# Patient Record
Sex: MALE | Race: WHITE | Employment: OTHER | URBAN - NONMETROPOLITAN AREA
[De-identification: names, ages, dates, MRNs, and addresses within clinical notes are randomized per-mention and may not be internally consistent; named-entity substitution may affect disease eponyms.]

---

## 2017-01-17 ENCOUNTER — OFFICE VISIT (OUTPATIENT)
Dept: CARDIOLOGY CLINIC | Age: 62
End: 2017-01-17

## 2017-01-17 VITALS
SYSTOLIC BLOOD PRESSURE: 130 MMHG | BODY MASS INDEX: 35.46 KG/M2 | WEIGHT: 234 LBS | RESPIRATION RATE: 16 BRPM | DIASTOLIC BLOOD PRESSURE: 90 MMHG | HEIGHT: 68 IN | HEART RATE: 84 BPM

## 2017-01-17 DIAGNOSIS — Z99.89 OSA ON CPAP: ICD-10-CM

## 2017-01-17 DIAGNOSIS — Z95.1 S/P CABG X 2: Primary | ICD-10-CM

## 2017-01-17 DIAGNOSIS — E78.2 MIXED HYPERLIPIDEMIA: ICD-10-CM

## 2017-01-17 DIAGNOSIS — G47.33 OSA ON CPAP: ICD-10-CM

## 2017-01-17 PROCEDURE — 99214 OFFICE O/P EST MOD 30 MIN: CPT | Performed by: INTERNAL MEDICINE

## 2017-05-01 ENCOUNTER — HOSPITAL ENCOUNTER (OUTPATIENT)
Dept: GENERAL RADIOLOGY | Age: 62
Discharge: HOME | End: 2017-05-01

## 2017-05-01 DIAGNOSIS — M25.561 ACUTE PAIN OF RIGHT KNEE: ICD-10-CM

## 2017-05-03 ENCOUNTER — OFFICE VISIT (OUTPATIENT)
Dept: ORTHOPEDIC SURGERY | Age: 62
End: 2017-05-03

## 2017-05-03 VITALS — HEIGHT: 68 IN | WEIGHT: 225 LBS | RESPIRATION RATE: 16 BRPM | BODY MASS INDEX: 34.1 KG/M2

## 2017-05-03 DIAGNOSIS — M25.561 RIGHT KNEE PAIN, UNSPECIFIED CHRONICITY: ICD-10-CM

## 2017-05-03 DIAGNOSIS — S83.281A TEAR OF LATERAL MENISCUS OF RIGHT KNEE, CURRENT, UNSPECIFIED TEAR TYPE, INITIAL ENCOUNTER: Primary | ICD-10-CM

## 2017-05-03 PROCEDURE — 99244 OFF/OP CNSLTJ NEW/EST MOD 40: CPT | Performed by: PHYSICIAN ASSISTANT

## 2017-05-03 PROCEDURE — 73560 X-RAY EXAM OF KNEE 1 OR 2: CPT | Performed by: PHYSICIAN ASSISTANT

## 2017-05-03 RX ORDER — GABAPENTIN 300 MG/1
300 CAPSULE ORAL 2 TIMES DAILY
COMMUNITY
Start: 2017-03-28 | End: 2018-08-27

## 2017-05-03 ASSESSMENT — ENCOUNTER SYMPTOMS
EYES NEGATIVE: 1
GASTROINTESTINAL NEGATIVE: 1
RESPIRATORY NEGATIVE: 1

## 2017-05-10 ENCOUNTER — HOSPITAL ENCOUNTER (OUTPATIENT)
Dept: MRI IMAGING | Age: 62
Discharge: HOME | End: 2017-05-10

## 2017-05-10 DIAGNOSIS — M25.561 RIGHT KNEE PAIN, UNSPECIFIED CHRONICITY: ICD-10-CM

## 2017-05-10 DIAGNOSIS — S83.281A TEAR OF LATERAL MENISCUS OF RIGHT KNEE, CURRENT, UNSPECIFIED TEAR TYPE, INITIAL ENCOUNTER: ICD-10-CM

## 2017-05-10 DIAGNOSIS — S83.281A OTHER TEAR OF LATERAL MENISCUS, CURRENT INJURY, RIGHT KNEE, INITIAL ENCOUNTER: ICD-10-CM

## 2017-05-15 ENCOUNTER — TELEPHONE (OUTPATIENT)
Dept: ORTHOPEDIC SURGERY | Age: 62
End: 2017-05-15

## 2017-05-18 ENCOUNTER — OFFICE VISIT (OUTPATIENT)
Dept: ORTHOPEDIC SURGERY | Age: 62
End: 2017-05-18

## 2017-05-18 VITALS — RESPIRATION RATE: 16 BRPM | BODY MASS INDEX: 34.1 KG/M2 | WEIGHT: 225 LBS | HEIGHT: 68 IN

## 2017-05-18 DIAGNOSIS — M17.11 PRIMARY OSTEOARTHRITIS OF RIGHT KNEE: Primary | ICD-10-CM

## 2017-05-18 DIAGNOSIS — M23.203 OLD TEAR OF MEDIAL MENISCUS OF RIGHT KNEE, UNSPECIFIED TEAR TYPE: ICD-10-CM

## 2017-05-18 DIAGNOSIS — M54.16 LUMBAR RADICULOPATHY: ICD-10-CM

## 2017-05-18 PROCEDURE — 99213 OFFICE O/P EST LOW 20 MIN: CPT | Performed by: ORTHOPAEDIC SURGERY

## 2017-05-18 PROCEDURE — 20610 DRAIN/INJ JOINT/BURSA W/O US: CPT | Performed by: ORTHOPAEDIC SURGERY

## 2017-06-26 LAB
ALBUMIN SERPL-MCNC: 4.4 G/DL
ALP BLD-CCNC: 81 U/L
ALT SERPL-CCNC: 121 U/L
ANION GAP SERPL CALCULATED.3IONS-SCNC: NORMAL MMOL/L
AST SERPL-CCNC: 58 U/L
BILIRUB SERPL-MCNC: 0.6 MG/DL (ref 0.1–1.4)
BUN BLDV-MCNC: 14 MG/DL
CALCIUM SERPL-MCNC: NORMAL MG/DL
CHLORIDE BLD-SCNC: 104 MMOL/L
CHOLESTEROL, TOTAL: 268 MG/DL
CHOLESTEROL/HDL RATIO: ABNORMAL
CO2: NORMAL MMOL/L
CREAT SERPL-MCNC: 0.9 MG/DL
GFR CALCULATED: NORMAL
GLUCOSE BLD-MCNC: 80 MG/DL
HDLC SERPL-MCNC: 38 MG/DL (ref 35–70)
LDL CHOLESTEROL CALCULATED: 197 MG/DL (ref 0–160)
POTASSIUM SERPL-SCNC: 4.4 MMOL/L
SODIUM BLD-SCNC: 141 MMOL/L
TOTAL PROTEIN: NORMAL
TRIGL SERPL-MCNC: 167 MG/DL
TSH SERPL DL<=0.05 MIU/L-ACNC: 1.28 UIU/ML
VLDLC SERPL CALC-MCNC: ABNORMAL MG/DL

## 2017-07-14 LAB
BASOPHILS ABSOLUTE: NORMAL /ΜL
BASOPHILS RELATIVE PERCENT: NORMAL %
EOSINOPHILS ABSOLUTE: NORMAL /ΜL
EOSINOPHILS RELATIVE PERCENT: NORMAL %
HCT VFR BLD CALC: 41.1 % (ref 41–53)
HEMOGLOBIN: 14.2 G/DL (ref 13.5–17.5)
LYMPHOCYTES ABSOLUTE: NORMAL /ΜL
LYMPHOCYTES RELATIVE PERCENT: NORMAL %
MCH RBC QN AUTO: NORMAL PG
MCHC RBC AUTO-ENTMCNC: NORMAL G/DL
MCV RBC AUTO: NORMAL FL
MONOCYTES ABSOLUTE: NORMAL /ΜL
MONOCYTES RELATIVE PERCENT: NORMAL %
NEUTROPHILS ABSOLUTE: NORMAL /ΜL
NEUTROPHILS RELATIVE PERCENT: NORMAL %
PLATELET # BLD: 214 K/ΜL
PMV BLD AUTO: NORMAL FL
RBC # BLD: 4.5 10^6/ΜL
WBC # BLD: 8.69 10^3/ML

## 2017-08-21 ENCOUNTER — OFFICE VISIT (OUTPATIENT)
Dept: CARDIOLOGY CLINIC | Age: 62
End: 2017-08-21

## 2017-08-21 VITALS
HEIGHT: 68 IN | BODY MASS INDEX: 33.8 KG/M2 | HEART RATE: 100 BPM | RESPIRATION RATE: 14 BRPM | SYSTOLIC BLOOD PRESSURE: 128 MMHG | WEIGHT: 223 LBS | DIASTOLIC BLOOD PRESSURE: 84 MMHG

## 2017-08-21 DIAGNOSIS — Z99.89 OSA ON CPAP: ICD-10-CM

## 2017-08-21 DIAGNOSIS — Z95.1 S/P CABG X 2: Primary | ICD-10-CM

## 2017-08-21 DIAGNOSIS — G47.33 OSA ON CPAP: ICD-10-CM

## 2017-08-21 DIAGNOSIS — E78.2 MIXED HYPERLIPIDEMIA: ICD-10-CM

## 2017-08-21 DIAGNOSIS — I25.810 CORONARY ARTERY DISEASE INVOLVING CORONARY BYPASS GRAFT OF NATIVE HEART WITHOUT ANGINA PECTORIS: ICD-10-CM

## 2017-08-21 PROCEDURE — 99214 OFFICE O/P EST MOD 30 MIN: CPT | Performed by: INTERNAL MEDICINE

## 2017-12-26 LAB
ALBUMIN SERPL-MCNC: 4.4 G/DL
ALP BLD-CCNC: 71 U/L
ALT SERPL-CCNC: 135 U/L
ANION GAP SERPL CALCULATED.3IONS-SCNC: NORMAL MMOL/L
AST SERPL-CCNC: 91 U/L
BASOPHILS ABSOLUTE: NORMAL /ΜL
BASOPHILS RELATIVE PERCENT: NORMAL %
BILIRUB SERPL-MCNC: 0.5 MG/DL (ref 0.1–1.4)
BUN BLDV-MCNC: 14 MG/DL
CALCIUM SERPL-MCNC: NORMAL MG/DL
CHLORIDE BLD-SCNC: 106 MMOL/L
CO2: NORMAL MMOL/L
CREAT SERPL-MCNC: 0.9 MG/DL
EOSINOPHILS ABSOLUTE: NORMAL /ΜL
EOSINOPHILS RELATIVE PERCENT: NORMAL %
GFR CALCULATED: NORMAL
GLUCOSE BLD-MCNC: 96 MG/DL
HCT VFR BLD CALC: 45.8 % (ref 41–53)
HEMOGLOBIN: 15 G/DL (ref 13.5–17.5)
INR BLD: 0.9
LYMPHOCYTES ABSOLUTE: NORMAL /ΜL
LYMPHOCYTES RELATIVE PERCENT: NORMAL %
MCH RBC QN AUTO: NORMAL PG
MCHC RBC AUTO-ENTMCNC: NORMAL G/DL
MCV RBC AUTO: NORMAL FL
MONOCYTES ABSOLUTE: NORMAL /ΜL
MONOCYTES RELATIVE PERCENT: NORMAL %
NEUTROPHILS ABSOLUTE: NORMAL /ΜL
NEUTROPHILS RELATIVE PERCENT: NORMAL %
PLATELET # BLD: 240 K/ΜL
PMV BLD AUTO: NORMAL FL
POTASSIUM SERPL-SCNC: 4.5 MMOL/L
PROTIME: 9.7 SECONDS
RBC # BLD: 4.95 10^6/ΜL
SODIUM BLD-SCNC: 143 MMOL/L
TOTAL PROTEIN: NORMAL
WBC # BLD: 9.68 10^3/ML

## 2018-02-26 ENCOUNTER — OFFICE VISIT (OUTPATIENT)
Dept: CARDIOLOGY CLINIC | Age: 63
End: 2018-02-26

## 2018-02-26 VITALS
HEART RATE: 66 BPM | BODY MASS INDEX: 34.25 KG/M2 | RESPIRATION RATE: 16 BRPM | HEIGHT: 68 IN | SYSTOLIC BLOOD PRESSURE: 130 MMHG | WEIGHT: 226 LBS | DIASTOLIC BLOOD PRESSURE: 86 MMHG

## 2018-02-26 DIAGNOSIS — M17.11 OSTEOARTHRITIS OF RIGHT KNEE, UNSPECIFIED OSTEOARTHRITIS TYPE: ICD-10-CM

## 2018-02-26 DIAGNOSIS — G47.33 OSA ON CPAP: ICD-10-CM

## 2018-02-26 DIAGNOSIS — Z99.89 OSA ON CPAP: ICD-10-CM

## 2018-02-26 DIAGNOSIS — Z95.1 S/P CABG X 2: Primary | ICD-10-CM

## 2018-02-26 DIAGNOSIS — E78.2 MIXED HYPERLIPIDEMIA: ICD-10-CM

## 2018-02-26 PROBLEM — M00.9 PYOGENIC ARTHRITIS OF RIGHT KNEE JOINT (HCC): Status: ACTIVE | Noted: 2018-02-26

## 2018-02-26 PROCEDURE — 99214 OFFICE O/P EST MOD 30 MIN: CPT | Performed by: INTERNAL MEDICINE

## 2018-02-26 RX ORDER — ATORVASTATIN CALCIUM 10 MG/1
10 TABLET, FILM COATED ORAL DAILY
COMMUNITY
End: 2018-08-27 | Stop reason: DRUGHIGH

## 2018-02-26 RX ORDER — NITROGLYCERIN 0.4 MG/1
0.4 TABLET SUBLINGUAL EVERY 5 MIN PRN
Qty: 25 TABLET | Refills: 3 | Status: SHIPPED | OUTPATIENT
Start: 2018-02-26 | End: 2020-12-29 | Stop reason: SDUPTHER

## 2018-02-26 NOTE — PROGRESS NOTES
ischemic attack). Past surgical history:  has a past surgical history that includes Appendectomy; Cholecystectomy; Coronary artery bypass graft (6/18/2012); and Inguinal hernia repair (Right, 07/14/2016). Social History:   Social History   Substance Use Topics    Smoking status: Former Smoker     Packs/day: 1.00     Years: 43.00     Quit date: 8/11/2010    Smokeless tobacco: Never Used      Comment: Reviewed 2/26/18    Alcohol use No     Family history: family history includes Cancer in his father and sister; Diabetes in his sister; Heart Disease in his mother; High Cholesterol in his sister; Stroke in his brother and brother. ALLERGIES:  Clopidogrel and Simvastatin  Prior to Admission medications    Medication Sig Start Date End Date Taking? Authorizing Provider   atorvastatin (LIPITOR) 10 MG tablet Take 10 mg by mouth daily   Yes Historical Provider, MD   nitroGLYCERIN (NITROSTAT) 0.4 MG SL tablet Place 1 tablet under the tongue every 5 minutes as needed for Chest pain 2/26/18  Yes Lam Lozada MD   gabapentin (NEURONTIN) 300 MG capsule Take 300 mg by mouth 2 times daily  3/28/17  Yes Historical Provider, MD   diclofenac (VOLTAREN) 75 MG EC tablet Take 75 mg by mouth 2 times daily  8/4/15  Yes Historical Provider, MD   levothyroxine (SYNTHROID) 150 MCG tablet Take 150 mcg by mouth daily. Yes Historical Provider, MD   aspirin EC 81 MG EC tablet Take 1 tablet by mouth daily. 6/23/12  Yes Rupesh Gifford MD     Constitutional:  /86 (Site: Right Arm, Position: Sitting, Cuff Size: Medium Adult)   Pulse 66   Resp 16   Ht 5' 8\" (1.727 m)   Wt 226 lb (102.5 kg)   BMI 34.36 kg/m²    Body mass index is 34.36 kg/m². Wt Readings from Last 3 Encounters:   02/26/18 226 lb (102.5 kg)   08/21/17 223 lb (101.2 kg)   05/18/17 225 lb (102.1 kg)     General exam: Pleasant male in no apparent distress awake alert oriented comfortable   Head and Neck: Normocephalic. No thyromegaly. Neck is supple.   He

## 2018-07-09 LAB
ALBUMIN SERPL-MCNC: 4.2 G/DL
ALBUMIN SERPL-MCNC: 4.2 G/DL
ALP BLD-CCNC: 72 U/L
ALP BLD-CCNC: 72 U/L
ALT SERPL-CCNC: 101 U/L
ALT SERPL-CCNC: 101 U/L
ANION GAP SERPL CALCULATED.3IONS-SCNC: NORMAL MMOL/L
ANION GAP SERPL CALCULATED.3IONS-SCNC: NORMAL MMOL/L
AST SERPL-CCNC: 55 U/L
AST SERPL-CCNC: 55 U/L
BILIRUB SERPL-MCNC: 0.4 MG/DL (ref 0.1–1.4)
BILIRUB SERPL-MCNC: 0.4 MG/DL (ref 0.1–1.4)
BUN BLDV-MCNC: 12 MG/DL
BUN BLDV-MCNC: 12 MG/DL
CALCIUM SERPL-MCNC: 9.1 MG/DL
CALCIUM SERPL-MCNC: NORMAL MG/DL
CHLORIDE BLD-SCNC: 109 MMOL/L
CHLORIDE BLD-SCNC: 109 MMOL/L
CHOLESTEROL, TOTAL: 230 MG/DL
CHOLESTEROL, TOTAL: 230 MG/DL
CHOLESTEROL/HDL RATIO: ABNORMAL
CHOLESTEROL/HDL RATIO: ABNORMAL
CO2: 21 MMOL/L
CO2: NORMAL MMOL/L
CREAT SERPL-MCNC: 0.9 MG/DL
CREAT SERPL-MCNC: 0.9 MG/DL
GFR CALCULATED: NORMAL
GFR CALCULATED: NORMAL
GLUCOSE BLD-MCNC: 96 MG/DL
GLUCOSE BLD-MCNC: 96 MG/DL
HDLC SERPL-MCNC: 35 MG/DL (ref 35–70)
HDLC SERPL-MCNC: 35 MG/DL (ref 35–70)
LDL CHOLESTEROL CALCULATED: 163 MG/DL (ref 0–160)
LDL CHOLESTEROL CALCULATED: 163 MG/DL (ref 0–160)
POTASSIUM SERPL-SCNC: 4.5 MMOL/L
POTASSIUM SERPL-SCNC: 4.5 MMOL/L
SODIUM BLD-SCNC: 143 MMOL/L
SODIUM BLD-SCNC: 143 MMOL/L
TOTAL PROTEIN: 7.2
TOTAL PROTEIN: NORMAL
TRIGL SERPL-MCNC: 159 MG/DL
TRIGL SERPL-MCNC: 159 MG/DL
VLDLC SERPL CALC-MCNC: 32 MG/DL
VLDLC SERPL CALC-MCNC: ABNORMAL MG/DL

## 2018-08-27 ENCOUNTER — OFFICE VISIT (OUTPATIENT)
Dept: CARDIOLOGY CLINIC | Age: 63
End: 2018-08-27

## 2018-08-27 VITALS
HEART RATE: 80 BPM | SYSTOLIC BLOOD PRESSURE: 112 MMHG | DIASTOLIC BLOOD PRESSURE: 80 MMHG | BODY MASS INDEX: 33.8 KG/M2 | WEIGHT: 223 LBS | RESPIRATION RATE: 16 BRPM | HEIGHT: 68 IN

## 2018-08-27 DIAGNOSIS — Z95.1 S/P CABG X 2: Primary | ICD-10-CM

## 2018-08-27 DIAGNOSIS — E78.2 MIXED HYPERLIPIDEMIA: ICD-10-CM

## 2018-08-27 DIAGNOSIS — Z99.89 OSA ON CPAP: ICD-10-CM

## 2018-08-27 DIAGNOSIS — G47.33 OSA ON CPAP: ICD-10-CM

## 2018-08-27 PROCEDURE — 99213 OFFICE O/P EST LOW 20 MIN: CPT | Performed by: INTERNAL MEDICINE

## 2018-08-27 RX ORDER — LISINOPRIL 10 MG/1
10 TABLET ORAL DAILY
COMMUNITY

## 2018-08-27 RX ORDER — ATORVASTATIN CALCIUM 20 MG/1
20 TABLET, FILM COATED ORAL DAILY
COMMUNITY
End: 2019-06-25

## 2018-08-27 NOTE — PROGRESS NOTES
sternal incision. Abdomen:  No masses or tenderness. No organomegaly noted. Musculoskeletal:  No obvious spinal deformities noted. Gait is normal.  Muscle strength is normal.  Neurologic:  Oriented to time, place, and person and non-anxious. No focal neurological deficit noted. Psychiatric: Normal mood and effect. LAB REVIEW:    CBC:   Lab Results   Component Value Date    WBC 9.68 12/26/2017    HGB 15.0 12/26/2017    HCT 45.8 12/26/2017     12/26/2017     Lipids:   Lab Results   Component Value Date    CHOL 230 07/09/2018    TRIG 159 07/09/2018    HDL 35 07/09/2018    LDLCALC 163 (A) 07/09/2018     AST 55  U/L Final 07/09/2018 12:00 AM EXT             Renal:   Lab Results   Component Value Date    BUN 12 07/09/2018    CREATININE 0.90 07/09/2018     07/09/2018    K 4.5 07/09/2018     PT/INR:   Lab Results   Component Value Date    INR 0.9 12/26/2017     IMPRESSION and RECOMMENDATIONS:      1. S/P CABG x 2  Clinically stable. Continue risk modification for secondary prevention. Patient had not had any stress tests since bypass surgery which she wanted to postpone it. 2. ASMITA on CPAP  Patient is off of CPAP therapy now due to lack of benefit. 3. Mixed hyperlipidemia  Continue statin therapy and follow-up with PCP for lipid analysis. LDL goal is less than 100 if liver enzymes are not too high. Continue current cardiovascular medications which have been reviewed and discussed individually with you. Follow up lipids and LFT with PCP and bring the results. Primary/secondary prevention is the goal by aggressive risk modification, healthy and therapeutic life style changes for cardiovascular risk reduction. Various goals are discussed and questions answered. Appropriate prescriptions if needed on this visit are addressed. After visit summery is provided. Questions answered and patient verbalizes understanding. Follow up in office in 6 months, sooner if needed.     Marlene Pollard

## 2019-03-22 LAB
ALBUMIN SERPL-MCNC: 4.4 G/DL
ALP BLD-CCNC: 73 U/L
ALT SERPL-CCNC: 95 U/L
ANION GAP SERPL CALCULATED.3IONS-SCNC: NORMAL MMOL/L
AST SERPL-CCNC: 60 U/L
BASOPHILS ABSOLUTE: NORMAL /ΜL
BASOPHILS RELATIVE PERCENT: NORMAL %
BILIRUB SERPL-MCNC: 0.6 MG/DL (ref 0.1–1.4)
BUN BLDV-MCNC: 22 MG/DL
CALCIUM SERPL-MCNC: NORMAL MG/DL
CHLORIDE BLD-SCNC: 103 MMOL/L
CHOLESTEROL, TOTAL: 173 MG/DL
CHOLESTEROL/HDL RATIO: NORMAL
CO2: NORMAL MMOL/L
CREAT SERPL-MCNC: 1.1 MG/DL
EOSINOPHILS ABSOLUTE: NORMAL /ΜL
EOSINOPHILS RELATIVE PERCENT: NORMAL %
GFR CALCULATED: NORMAL
GLUCOSE BLD-MCNC: 91 MG/DL
HCT VFR BLD CALC: 44.6 % (ref 41–53)
HDLC SERPL-MCNC: 38 MG/DL (ref 35–70)
HEMOGLOBIN: 14.9 G/DL (ref 13.5–17.5)
LDL CHOLESTEROL CALCULATED: 102 MG/DL (ref 0–160)
LYMPHOCYTES ABSOLUTE: NORMAL /ΜL
LYMPHOCYTES RELATIVE PERCENT: NORMAL %
MCH RBC QN AUTO: NORMAL PG
MCHC RBC AUTO-ENTMCNC: NORMAL G/DL
MCV RBC AUTO: NORMAL FL
MONOCYTES ABSOLUTE: NORMAL /ΜL
MONOCYTES RELATIVE PERCENT: NORMAL %
NEUTROPHILS ABSOLUTE: NORMAL /ΜL
NEUTROPHILS RELATIVE PERCENT: NORMAL %
PLATELET # BLD: 259 K/ΜL
PMV BLD AUTO: NORMAL FL
POTASSIUM SERPL-SCNC: 4.5 MMOL/L
RBC # BLD: 4.83 10^6/ΜL
SODIUM BLD-SCNC: 137 MMOL/L
TOTAL PROTEIN: NORMAL
TRIGL SERPL-MCNC: 166 MG/DL
TSH SERPL DL<=0.05 MIU/L-ACNC: 1.74 UIU/ML
VLDLC SERPL CALC-MCNC: NORMAL MG/DL
WBC # BLD: 9.49 10^3/ML

## 2019-06-25 ENCOUNTER — OFFICE VISIT (OUTPATIENT)
Dept: CARDIOLOGY CLINIC | Age: 64
End: 2019-06-25
Payer: MEDICARE

## 2019-06-25 VITALS
HEART RATE: 78 BPM | DIASTOLIC BLOOD PRESSURE: 80 MMHG | BODY MASS INDEX: 34.56 KG/M2 | HEIGHT: 68 IN | SYSTOLIC BLOOD PRESSURE: 110 MMHG | RESPIRATION RATE: 16 BRPM | WEIGHT: 228 LBS

## 2019-06-25 DIAGNOSIS — Z95.1 S/P CABG X 2: Primary | ICD-10-CM

## 2019-06-25 DIAGNOSIS — E78.2 MIXED HYPERLIPIDEMIA: ICD-10-CM

## 2019-06-25 DIAGNOSIS — G47.33 OSA ON CPAP: ICD-10-CM

## 2019-06-25 DIAGNOSIS — M25.512 LEFT SHOULDER PAIN, UNSPECIFIED CHRONICITY: ICD-10-CM

## 2019-06-25 DIAGNOSIS — Z99.89 OSA ON CPAP: ICD-10-CM

## 2019-06-25 PROCEDURE — 99214 OFFICE O/P EST MOD 30 MIN: CPT | Performed by: INTERNAL MEDICINE

## 2019-06-25 PROCEDURE — 3017F COLORECTAL CA SCREEN DOC REV: CPT | Performed by: INTERNAL MEDICINE

## 2019-06-25 PROCEDURE — 1036F TOBACCO NON-USER: CPT | Performed by: INTERNAL MEDICINE

## 2019-06-25 PROCEDURE — G8417 CALC BMI ABV UP PARAM F/U: HCPCS | Performed by: INTERNAL MEDICINE

## 2019-06-25 PROCEDURE — 4040F PNEUMOC VAC/ADMIN/RCVD: CPT | Performed by: INTERNAL MEDICINE

## 2019-06-25 PROCEDURE — G8599 NO ASA/ANTIPLAT THER USE RNG: HCPCS | Performed by: INTERNAL MEDICINE

## 2019-06-25 PROCEDURE — G8427 DOCREV CUR MEDS BY ELIG CLIN: HCPCS | Performed by: INTERNAL MEDICINE

## 2019-06-25 PROCEDURE — 1123F ACP DISCUSS/DSCN MKR DOCD: CPT | Performed by: INTERNAL MEDICINE

## 2019-06-25 RX ORDER — LEVOTHYROXINE SODIUM 137 MCG
TABLET ORAL
Refills: 5 | COMMUNITY
Start: 2019-05-16

## 2019-06-25 RX ORDER — ATORVASTATIN CALCIUM 40 MG/1
40 TABLET, FILM COATED ORAL DAILY
Qty: 90 TABLET | Refills: 3 | Status: SHIPPED | OUTPATIENT
Start: 2019-06-25 | End: 2020-11-25 | Stop reason: SDUPTHER

## 2019-06-25 NOTE — ASSESSMENT & PLAN NOTE
Clinically stable. Left shoulder pain could be angina equivalent. We will evaluate him noninvasively. Last stress test was more than 5 years ago which was normal in December of 2013.

## 2019-06-25 NOTE — PROGRESS NOTES
Heart Disease in his mother; High Cholesterol in his sister; Stroke in his brother and brother. ALLERGIES:  Clopidogrel and Simvastatin  Prior to Admission medications    Medication Sig Start Date End Date Taking? Authorizing Provider   SYNTHROID 137 MCG tablet TAKE 1 TABLET BY MOUTH EVERY DAY 5/16/19  Yes Historical Provider, MD   atorvastatin (LIPITOR) 40 MG tablet Take 1 tablet by mouth daily 6/25/19  Yes Pavan Patel MD   lisinopril (PRINIVIL;ZESTRIL) 10 MG tablet Take 10 mg by mouth daily   Yes Historical Provider, MD   nitroGLYCERIN (NITROSTAT) 0.4 MG SL tablet Place 1 tablet under the tongue every 5 minutes as needed for Chest pain 2/26/18  Yes Pavan Patel MD   diclofenac (VOLTAREN) 75 MG EC tablet Take 75 mg by mouth 2 times daily  8/4/15  Yes Historical Provider, MD   aspirin EC 81 MG EC tablet Take 1 tablet by mouth daily. 6/23/12  Yes Ellen Morales MD     Vitals:    06/25/19 1613   BP: 110/80   Pulse: 78   Resp: 16   Weight: 228 lb (103.4 kg)   Height: 5' 8\" (1.727 m)      Body mass index is 34.67 kg/m². Wt Readings from Last 3 Encounters:   06/25/19 228 lb (103.4 kg)   08/27/18 223 lb (101.2 kg)   02/26/18 226 lb (102.5 kg)     Constitutional: Mildly overweight pleasant male in no apparent distress  Eyes: Pupils are equal in both eyes. He wears glasses. NECK: No JVP or thyromegaly  Cardiovascular: Auscultation: Normal S1 and S2. No murmurs or gallops noted. .  Carotids are negative for bruits. Abdominal aorta is nonpalpable. No epigastric bruit noted. Peripheral pulses: 1+ equal in both feet  Respiratory:  Respiratory effort is normal.  Breath sounds are clear to auscultation. Extremities:  No edema, clubbing,  Cyanosis, petechiae. SKIN: Warm and well perfused, no pallor or cyanosis. Keloid noted along the sternal incision. Abdomen:  No masses or tenderness. No organomegaly noted. Musculoskeletal:  No obvious spinal deformities noted.   Gait is normal.  Muscle strength is needed. Edson Lopez MD, 6/25/2019 5:33 PM     Please note this report has been partially produced using speech recognition software and may contain errors related to that system including errors in grammar, punctuation, and spelling, as well as words and phrases that may be inappropriate. If there are any questions or concerns please feel free to contact the dictating provider for clarification.

## 2019-06-25 NOTE — PATIENT INSTRUCTIONS
Increase Lipitor 40 mg daily and do a stress Cardiolite and chcrt check. Repeat lipids in 2 months. LDL goal is below 70. Primary/secondary prevention is the goal by aggressive risk modification, healthy and therapeutic life style changes for cardiovascular risk reduction. Various goals are discussed and questions answered. Appropriate prescriptions if needed on this visit are addressed. After visit summery is provided. Questions answered and patient verbalizes understanding. Follow up in office in 12 months with ECG, sooner if needed. Please hold on to these instructions the  will call you within 1-9 business days when we receive authorization from your insurance. Nuclear Stress Test    WHAT TO EXPECT:   ? You will need to confirm the test or it could be cancelled. ? This test will take approximately 2 hours: 1 hour in the AM &    1 hour in the PM. You will be given a time by the Technologist after the first part is completed to come back. ? You will be given a medication, through an IV in the hand, this will safely simulate exercise. This IV is also needed to inject the radioactive isotope unless you are able toe walk the treadmill. ? You will receive an injection in the AM & PM before the pictures. ? Using a special camera, you will have one set of pictures of your heart taken in the AM and a set of pictures in the PM.     PREPARATION FOR TEST:  ? Eat a light breakfast such as water or juice and toast.  ? If you are DIABETIC: Eat a normal breakfast with NO CAFFEINE and take your insulin as normal.   ? AVOID ALL FOODS & DRINKS containing CAFFEINE 12 HOURS PRIOR TO THE TEST: Including coffee, Tea, Stefani and other soft drinks even those labeled  caffeine free or decaffeinated.

## 2019-06-25 NOTE — ASSESSMENT & PLAN NOTE
Last LDL was 102 in March of this year. Increase Lipitor to 40 mg daily. Diet and weight management consult repeat lipids in 2 months. LDL goal is less than 70.

## 2019-07-02 ENCOUNTER — PROCEDURE VISIT (OUTPATIENT)
Dept: CARDIOLOGY CLINIC | Age: 64
End: 2019-07-02
Payer: MEDICARE

## 2019-07-02 DIAGNOSIS — M25.512 LEFT SHOULDER PAIN, UNSPECIFIED CHRONICITY: ICD-10-CM

## 2019-07-02 DIAGNOSIS — Z95.1 S/P CABG X 2: ICD-10-CM

## 2019-07-02 LAB
LV EF: 56 %
LVEF MODALITY: NORMAL

## 2019-07-02 PROCEDURE — 93017 CV STRESS TEST TRACING ONLY: CPT | Performed by: INTERNAL MEDICINE

## 2019-07-02 PROCEDURE — A9500 TC99M SESTAMIBI: HCPCS | Performed by: INTERNAL MEDICINE

## 2019-07-02 PROCEDURE — 78452 HT MUSCLE IMAGE SPECT MULT: CPT | Performed by: INTERNAL MEDICINE

## 2019-07-02 PROCEDURE — 93018 CV STRESS TEST I&R ONLY: CPT | Performed by: INTERNAL MEDICINE

## 2019-07-02 PROCEDURE — 93016 CV STRESS TEST SUPVJ ONLY: CPT | Performed by: INTERNAL MEDICINE

## 2019-07-03 ENCOUNTER — TELEPHONE (OUTPATIENT)
Dept: CARDIOLOGY CLINIC | Age: 64
End: 2019-07-03

## 2020-02-13 ENCOUNTER — HOSPITAL ENCOUNTER (OUTPATIENT)
Dept: NEUROLOGY | Age: 65
Discharge: HOME | End: 2020-02-13
Payer: MEDICARE

## 2020-02-13 PROCEDURE — 95861 NEEDLE EMG 2 EXTREMITIES: CPT

## 2020-05-04 LAB
ALBUMIN SERPL-MCNC: 4.2 G/DL
ALP BLD-CCNC: 58 U/L
ALT SERPL-CCNC: 140 U/L
ANION GAP SERPL CALCULATED.3IONS-SCNC: NORMAL MMOL/L
AST SERPL-CCNC: 83 U/L
AVERAGE GLUCOSE: NORMAL
BILIRUB SERPL-MCNC: 0.4 MG/DL (ref 0.1–1.4)
BUN BLDV-MCNC: 17 MG/DL
CALCIUM SERPL-MCNC: NORMAL MG/DL
CHLORIDE BLD-SCNC: 103 MMOL/L
CHOLESTEROL, TOTAL: 142 MG/DL
CHOLESTEROL/HDL RATIO: ABNORMAL
CO2: NORMAL
CREAT SERPL-MCNC: 1 MG/DL
GFR CALCULATED: NORMAL
GLUCOSE BLD-MCNC: 90 MG/DL
HBA1C MFR BLD: 5.9 %
HDLC SERPL-MCNC: 34 MG/DL (ref 35–70)
LDL CHOLESTEROL CALCULATED: 78 MG/DL (ref 0–160)
POTASSIUM SERPL-SCNC: 4.5 MMOL/L
SODIUM BLD-SCNC: 138 MMOL/L
TOTAL PROTEIN: NORMAL
TRIGL SERPL-MCNC: 150 MG/DL
TSH SERPL DL<=0.05 MIU/L-ACNC: 6.24 UIU/ML
VLDLC SERPL CALC-MCNC: ABNORMAL MG/DL

## 2020-06-23 ENCOUNTER — OFFICE VISIT (OUTPATIENT)
Dept: CARDIOLOGY CLINIC | Age: 65
End: 2020-06-23
Payer: MEDICARE

## 2020-06-23 VITALS
HEART RATE: 77 BPM | WEIGHT: 230 LBS | BODY MASS INDEX: 34.86 KG/M2 | SYSTOLIC BLOOD PRESSURE: 126 MMHG | HEIGHT: 68 IN | DIASTOLIC BLOOD PRESSURE: 80 MMHG | TEMPERATURE: 97.3 F

## 2020-06-23 PROCEDURE — 4040F PNEUMOC VAC/ADMIN/RCVD: CPT | Performed by: INTERNAL MEDICINE

## 2020-06-23 PROCEDURE — 1123F ACP DISCUSS/DSCN MKR DOCD: CPT | Performed by: INTERNAL MEDICINE

## 2020-06-23 PROCEDURE — 99214 OFFICE O/P EST MOD 30 MIN: CPT | Performed by: INTERNAL MEDICINE

## 2020-06-23 PROCEDURE — 93000 ELECTROCARDIOGRAM COMPLETE: CPT | Performed by: INTERNAL MEDICINE

## 2020-06-23 PROCEDURE — G8417 CALC BMI ABV UP PARAM F/U: HCPCS | Performed by: INTERNAL MEDICINE

## 2020-06-23 PROCEDURE — G8427 DOCREV CUR MEDS BY ELIG CLIN: HCPCS | Performed by: INTERNAL MEDICINE

## 2020-06-23 PROCEDURE — 3017F COLORECTAL CA SCREEN DOC REV: CPT | Performed by: INTERNAL MEDICINE

## 2020-06-23 PROCEDURE — 1036F TOBACCO NON-USER: CPT | Performed by: INTERNAL MEDICINE

## 2020-06-23 NOTE — PATIENT INSTRUCTIONS
Continue current cardiovascular medications which have been reviewed and discussed individually with you. Counseled for calorie counting, reduction in serving size and exercise and lifestyle modification for weight loss. Primary/secondary prevention is the goal by aggressive risk modification, healthy and therapeutic life style changes for cardiovascular risk reduction. Various goals are discussed and questions answered. Appropriate prescriptions if needed on this visit are addressed. After visit summery is provided. Questions answered and patient verbalizes understanding. Follow up in 6 months with repeat lipids,  sooner if needed.

## 2020-06-23 NOTE — PROGRESS NOTES
Muscle strength is normal.  Neurologic:  Oriented to time, place, and person and non-anxious. No focal neurological deficit noted. Psychiatric: Normal mood and effect. EKG today is consistent with normal sinus rhythm 77 bpm.  Nondiagnostic Q waves are noted in lead 3 and aVF. Had a nuclear stress test last year negative for ischemia. LAB REVIEW:  CBC:   Lab Results   Component Value Date    WBC 9.49 03/22/2019    HGB 14.9 03/22/2019    HCT 44.6 03/22/2019     03/22/2019     Lipids:   Lab Results   Component Value Date    CHOL 173 03/22/2019    TRIG 166 03/22/2019    HDL 38 03/22/2019    LDLCALC 102 03/22/2019    LDLDIRECT 153 (H) 03/23/2012     Renal:   Lab Results   Component Value Date    BUN 22 03/22/2019    CREATININE 1.10 03/22/2019     03/22/2019    K 4.5 03/22/2019     PT/INR:   Lab Results   Component Value Date    INR 0.9 12/26/2017     Lab Results   Component Value Date    LABA1C 5.8 06/16/2012     IMPRESSION and RECOMMENDATIONS:      Hyperlipidemia  Improved on current medications. ASMITA on CPAP  Counseled to start using CPAP regularly and lose weight. S/P CABG x 2  Clinically stable. Left shoulder pain could be angina equivalent. We will evaluate him noninvasively. Last stress test was more than 5 years ago which was normal in December of 2013. Continue current cardiovascular medications which have been reviewed and discussed individually with you. Counseled for calorie counting, reduction in serving size and exercise and lifestyle modification for weight loss. Primary/secondary prevention is the goal by aggressive risk modification, healthy and therapeutic life style changes for cardiovascular risk reduction. Various goals are discussed and questions answered. Appropriate prescriptions if needed on this visit are addressed. After visit summery is provided. Questions answered and patient verbalizes understanding.  Follow up in 6 months with repeat lipids,  sooner if

## 2020-08-05 LAB
ALBUMIN SERPL-MCNC: 4.2 G/DL
ALP BLD-CCNC: 60 U/L
ALT SERPL-CCNC: 119 U/L
ANION GAP SERPL CALCULATED.3IONS-SCNC: NORMAL MMOL/L
AST SERPL-CCNC: 70 U/L
BASOPHILS ABSOLUTE: 0.1 /ΜL
BASOPHILS RELATIVE PERCENT: 0.9 %
BILIRUB SERPL-MCNC: 0.4 MG/DL (ref 0.1–1.4)
BUN BLDV-MCNC: 16 MG/DL
CALCIUM SERPL-MCNC: 9.8 MG/DL
CHLORIDE BLD-SCNC: 101 MMOL/L
CO2: 24 MMOL/L
CREAT SERPL-MCNC: 1.1 MG/DL
EOSINOPHILS ABSOLUTE: 0.2 /ΜL
EOSINOPHILS RELATIVE PERCENT: 2.5 %
GFR CALCULATED: NORMAL
GLUCOSE BLD-MCNC: 90 MG/DL
HCT VFR BLD CALC: 41.7 % (ref 41–53)
HEMOGLOBIN: 14 G/DL (ref 13.5–17.5)
LYMPHOCYTES ABSOLUTE: 2.7 /ΜL
LYMPHOCYTES RELATIVE PERCENT: 31.1 %
MCH RBC QN AUTO: NORMAL PG
MCHC RBC AUTO-ENTMCNC: NORMAL G/DL
MCV RBC AUTO: NORMAL FL
MONOCYTES ABSOLUTE: 0.9 /ΜL
MONOCYTES RELATIVE PERCENT: 10.3 %
NEUTROPHILS ABSOLUTE: 4.8 /ΜL
NEUTROPHILS RELATIVE PERCENT: 55.2 %
PLATELET # BLD: 270 K/ΜL
PMV BLD AUTO: NORMAL FL
POTASSIUM SERPL-SCNC: 4.6 MMOL/L
RBC # BLD: 4.37 10^6/ΜL
SODIUM BLD-SCNC: 137 MMOL/L
TOTAL PROTEIN: 7.2
WBC # BLD: 8.8 10^3/ML

## 2020-09-22 LAB
ALBUMIN SERPL-MCNC: 4.2 G/DL
ALP BLD-CCNC: 59 U/L
ALT SERPL-CCNC: 142 U/L
ANION GAP SERPL CALCULATED.3IONS-SCNC: 7.7 MMOL/L
AST SERPL-CCNC: 79 U/L
BASOPHILS ABSOLUTE: 0.05 /ΜL
BASOPHILS RELATIVE PERCENT: 0.6 %
BILIRUB SERPL-MCNC: 0.6 MG/DL (ref 0.1–1.4)
BUN BLDV-MCNC: 19 MG/DL
CALCIUM SERPL-MCNC: 9.1 MG/DL
CHLORIDE BLD-SCNC: 104 MMOL/L
CHOLESTEROL, TOTAL: 146 MG/DL
CHOLESTEROL/HDL RATIO: 3
CO2: 25 MMOL/L
CREAT SERPL-MCNC: 1 MG/DL
EOSINOPHILS ABSOLUTE: 0.29 /ΜL
EOSINOPHILS RELATIVE PERCENT: 3.2 %
GFR CALCULATED: 79
GLUCOSE BLD-MCNC: 99 MG/DL
HCT VFR BLD CALC: 40.8 % (ref 41–53)
HDLC SERPL-MCNC: 30 MG/DL (ref 35–70)
HEMOGLOBIN: 13.9 G/DL (ref 13.5–17.5)
LDL CHOLESTEROL CALCULATED: 82 MG/DL (ref 0–160)
LYMPHOCYTES ABSOLUTE: 3.23 /ΜL
LYMPHOCYTES RELATIVE PERCENT: 36 %
MCH RBC QN AUTO: ABNORMAL PG
MCHC RBC AUTO-ENTMCNC: ABNORMAL G/DL
MCV RBC AUTO: ABNORMAL FL
MONOCYTES ABSOLUTE: 0.89 /ΜL
MONOCYTES RELATIVE PERCENT: 9.9 %
NEUTROPHILS ABSOLUTE: 4.47 /ΜL
NEUTROPHILS RELATIVE PERCENT: 50 %
NONHDLC SERPL-MCNC: ABNORMAL MG/DL
PLATELET # BLD: 231 K/ΜL
PMV BLD AUTO: ABNORMAL FL
POTASSIUM SERPL-SCNC: 4.5 MMOL/L
RBC # BLD: 4.43 10^6/ΜL
SODIUM BLD-SCNC: 137 MMOL/L
TOTAL PROTEIN: 7.5
TRIGL SERPL-MCNC: 170 MG/DL
VLDLC SERPL CALC-MCNC: 34 MG/DL
WBC # BLD: 8.96 10^3/ML

## 2020-11-25 RX ORDER — ATORVASTATIN CALCIUM 40 MG/1
40 TABLET, FILM COATED ORAL DAILY
Qty: 90 TABLET | Refills: 3 | Status: ON HOLD | OUTPATIENT
Start: 2020-11-25 | End: 2021-06-09

## 2020-11-25 NOTE — TELEPHONE ENCOUNTER
Patient requested refill for Atorvastatin. Rx routed to [facility]. Video visit may be appropriate.

## 2020-12-29 ENCOUNTER — OFFICE VISIT (OUTPATIENT)
Dept: CARDIOLOGY CLINIC | Age: 65
End: 2020-12-29
Payer: MEDICARE

## 2020-12-29 VITALS
RESPIRATION RATE: 16 BRPM | WEIGHT: 232 LBS | TEMPERATURE: 98.1 F | BODY MASS INDEX: 35.16 KG/M2 | HEART RATE: 76 BPM | HEIGHT: 68 IN | DIASTOLIC BLOOD PRESSURE: 82 MMHG | SYSTOLIC BLOOD PRESSURE: 124 MMHG

## 2020-12-29 PROCEDURE — G8427 DOCREV CUR MEDS BY ELIG CLIN: HCPCS | Performed by: INTERNAL MEDICINE

## 2020-12-29 PROCEDURE — 1036F TOBACCO NON-USER: CPT | Performed by: INTERNAL MEDICINE

## 2020-12-29 PROCEDURE — 1123F ACP DISCUSS/DSCN MKR DOCD: CPT | Performed by: INTERNAL MEDICINE

## 2020-12-29 PROCEDURE — 3017F COLORECTAL CA SCREEN DOC REV: CPT | Performed by: INTERNAL MEDICINE

## 2020-12-29 PROCEDURE — 99214 OFFICE O/P EST MOD 30 MIN: CPT | Performed by: INTERNAL MEDICINE

## 2020-12-29 PROCEDURE — G8417 CALC BMI ABV UP PARAM F/U: HCPCS | Performed by: INTERNAL MEDICINE

## 2020-12-29 PROCEDURE — G8484 FLU IMMUNIZE NO ADMIN: HCPCS | Performed by: INTERNAL MEDICINE

## 2020-12-29 PROCEDURE — 4040F PNEUMOC VAC/ADMIN/RCVD: CPT | Performed by: INTERNAL MEDICINE

## 2020-12-29 RX ORDER — NITROGLYCERIN 0.4 MG/1
0.4 TABLET SUBLINGUAL EVERY 5 MIN PRN
Qty: 25 TABLET | Refills: 3 | Status: SHIPPED | OUTPATIENT
Start: 2020-12-29

## 2020-12-29 NOTE — PATIENT INSTRUCTIONS
Continue current cardiovascular medications which have been reviewed and discussed individually with you. Counseled for calorie counting, reduction in serving size and exercise and lifestyle modification for weight loss. Counseled for regular exercise. Appropriate prescriptions if needed on this visit are addressed. After visit summery is provided. Questions answered and patient verbalizes understanding. Follow up in 6 months,  sooner if needed.

## 2020-12-29 NOTE — ASSESSMENT & PLAN NOTE
Stable clinically. Primary/secondary prevention is the goal by aggressive risk modification, healthy and therapeutic life style changes for cardiovascular risk reduction. Various goals are discussed and questions answered.

## 2021-06-06 ENCOUNTER — APPOINTMENT (OUTPATIENT)
Dept: CT IMAGING | Age: 66
End: 2021-06-06
Payer: MEDICARE

## 2021-06-06 ENCOUNTER — APPOINTMENT (OUTPATIENT)
Dept: GENERAL RADIOLOGY | Age: 66
End: 2021-06-06
Payer: MEDICARE

## 2021-06-06 ENCOUNTER — HOSPITAL ENCOUNTER (EMERGENCY)
Age: 66
Discharge: ANOTHER HOSPITAL | End: 2021-06-07
Attending: EMERGENCY MEDICINE
Payer: MEDICARE

## 2021-06-06 DIAGNOSIS — R29.898 RIGHT ARM WEAKNESS: Primary | ICD-10-CM

## 2021-06-06 PROBLEM — I63.9 ACUTE CVA (CEREBROVASCULAR ACCIDENT) (HCC): Status: ACTIVE | Noted: 2021-06-06

## 2021-06-06 LAB
ALBUMIN SERPL-MCNC: 3.5 GM/DL (ref 3.4–5)
ALP BLD-CCNC: 62 IU/L (ref 40–129)
ALT SERPL-CCNC: 73 U/L (ref 10–40)
ANION GAP SERPL CALCULATED.3IONS-SCNC: 3 MMOL/L (ref 4–16)
AST SERPL-CCNC: 46 IU/L (ref 15–37)
BASOPHILS ABSOLUTE: 0 K/CU MM
BASOPHILS RELATIVE PERCENT: 0.4 % (ref 0–1)
BILIRUB SERPL-MCNC: 0.5 MG/DL (ref 0–1)
BUN BLDV-MCNC: 11 MG/DL (ref 6–23)
CALCIUM SERPL-MCNC: 8.5 MG/DL (ref 8.3–10.6)
CHLORIDE BLD-SCNC: 110 MMOL/L (ref 99–110)
CO2: 25 MMOL/L (ref 21–32)
CREAT SERPL-MCNC: 1.1 MG/DL (ref 0.9–1.3)
DIFFERENTIAL TYPE: ABNORMAL
EKG ATRIAL RATE: 99 BPM
EKG DIAGNOSIS: NORMAL
EKG P AXIS: 34 DEGREES
EKG P-R INTERVAL: 156 MS
EKG Q-T INTERVAL: 350 MS
EKG QRS DURATION: 90 MS
EKG QTC CALCULATION (BAZETT): 449 MS
EKG R AXIS: 32 DEGREES
EKG T AXIS: 47 DEGREES
EKG VENTRICULAR RATE: 99 BPM
EOSINOPHILS ABSOLUTE: 0.2 K/CU MM
EOSINOPHILS RELATIVE PERCENT: 2.2 % (ref 0–3)
GFR AFRICAN AMERICAN: >60 ML/MIN/1.73M2
GFR NON-AFRICAN AMERICAN: >60 ML/MIN/1.73M2
GLUCOSE BLD-MCNC: 93 MG/DL (ref 70–99)
HCT VFR BLD CALC: 35.5 % (ref 42–52)
HEMOGLOBIN: 12 GM/DL (ref 13.5–18)
IMMATURE NEUTROPHIL %: 0.4 % (ref 0–0.43)
LYMPHOCYTES ABSOLUTE: 2.9 K/CU MM
LYMPHOCYTES RELATIVE PERCENT: 36.5 % (ref 24–44)
MAGNESIUM: 1.7 MG/DL (ref 1.8–2.4)
MCH RBC QN AUTO: 30.8 PG (ref 27–31)
MCHC RBC AUTO-ENTMCNC: 33.8 % (ref 32–36)
MCV RBC AUTO: 91 FL (ref 78–100)
MONOCYTES ABSOLUTE: 0.9 K/CU MM
MONOCYTES RELATIVE PERCENT: 10.7 % (ref 0–4)
PDW BLD-RTO: 12.4 % (ref 11.7–14.9)
PLATELET # BLD: 206 K/CU MM (ref 140–440)
PMV BLD AUTO: 9.9 FL (ref 7.5–11.1)
POTASSIUM SERPL-SCNC: 3.5 MMOL/L (ref 3.5–5.1)
PRO-BNP: 31.35 PG/ML
RBC # BLD: 3.9 M/CU MM (ref 4.6–6.2)
SEGMENTED NEUTROPHILS ABSOLUTE COUNT: 4 K/CU MM
SEGMENTED NEUTROPHILS RELATIVE PERCENT: 49.8 % (ref 36–66)
SODIUM BLD-SCNC: 138 MMOL/L (ref 135–145)
TOTAL IMMATURE NEUTOROPHIL: 0.03 K/CU MM
TOTAL PROTEIN: 6.2 GM/DL (ref 6.4–8.2)
TROPONIN T: <0.01 NG/ML
WBC # BLD: 8 K/CU MM (ref 4–10.5)

## 2021-06-06 PROCEDURE — 96365 THER/PROPH/DIAG IV INF INIT: CPT

## 2021-06-06 PROCEDURE — 99285 EMERGENCY DEPT VISIT HI MDM: CPT

## 2021-06-06 PROCEDURE — 83880 ASSAY OF NATRIURETIC PEPTIDE: CPT

## 2021-06-06 PROCEDURE — 83735 ASSAY OF MAGNESIUM: CPT

## 2021-06-06 PROCEDURE — 84484 ASSAY OF TROPONIN QUANT: CPT

## 2021-06-06 PROCEDURE — 93010 ELECTROCARDIOGRAM REPORT: CPT | Performed by: INTERNAL MEDICINE

## 2021-06-06 PROCEDURE — 85025 COMPLETE CBC W/AUTO DIFF WBC: CPT

## 2021-06-06 PROCEDURE — 2580000003 HC RX 258: Performed by: EMERGENCY MEDICINE

## 2021-06-06 PROCEDURE — 71045 X-RAY EXAM CHEST 1 VIEW: CPT

## 2021-06-06 PROCEDURE — 80053 COMPREHEN METABOLIC PANEL: CPT

## 2021-06-06 PROCEDURE — 70450 CT HEAD/BRAIN W/O DYE: CPT

## 2021-06-06 PROCEDURE — 6360000004 HC RX CONTRAST MEDICATION: Performed by: EMERGENCY MEDICINE

## 2021-06-06 PROCEDURE — 70498 CT ANGIOGRAPHY NECK: CPT

## 2021-06-06 PROCEDURE — 93005 ELECTROCARDIOGRAM TRACING: CPT | Performed by: EMERGENCY MEDICINE

## 2021-06-06 PROCEDURE — 6360000002 HC RX W HCPCS: Performed by: EMERGENCY MEDICINE

## 2021-06-06 RX ADMIN — LEVETIRACETAM 1000 MG: 100 INJECTION, SOLUTION INTRAVENOUS at 18:38

## 2021-06-06 RX ADMIN — IOPAMIDOL 75 ML: 755 INJECTION, SOLUTION INTRAVENOUS at 16:43

## 2021-06-06 ASSESSMENT — ENCOUNTER SYMPTOMS
BACK PAIN: 0
RHINORRHEA: 0
SORE THROAT: 0
SHORTNESS OF BREATH: 0
NAUSEA: 0
VOMITING: 0
COUGH: 0
EYE PAIN: 0
EYE DISCHARGE: 0
ABDOMINAL PAIN: 0

## 2021-06-06 NOTE — ED NOTES
Pt states since he has been here his right arm has been painful and heavy feeling. Upon evaluation pt right arm flaccid.   Dr. Howard Shrestha informed and is evaluating pt       Elías Bull RN  06/06/21 3052 [facility] 280, RN  06/06/21 [id number]

## 2021-06-06 NOTE — ED NOTES
Transfer center called for stroke alert, pt transported to ct and per cart with cardiac monitor and RN present     Rosey Ludwig RN  06/06/21 [id number]

## 2021-06-06 NOTE — ED NOTES
Wife states he had been out mowing all morning. States got off the mower and walked into the shade. Wife states she gave him the NTG  Because of his past heart history. 4 minutes after getting the NTG he c/o not being able to see. She ran back into the house to get water and his head was rolled back and he was sweaty. EMS was called. Wife states he has done this before when he had his heart attack.       Michelle Robin RN  06/06/21 [id number]

## 2021-06-06 NOTE — ED PROVIDER NOTES
bit diaphoretic but was moving all extremities. Denies any acute pain complaints. Denies any focal deficits. Denies any change in his right leg weakness. He was answering questions and following commands. His blood pressure and vitals had overall improved. While emergency department, he then developed some right arm weakness. He is having difficulty resisting gravity with the right arm. Stroke alert was called. He does endorse a history of allergy to Plavix. Nursing Notes, Triage Notes & Vital Signs were reviewed. REVIEW OF SYSTEMS    (2-9 systems for level 4, 10 or more for level 5)     Review of Systems   Constitutional: Negative for chills and fever. HENT: Negative for congestion, rhinorrhea and sore throat. Eyes: Negative for pain and discharge. Respiratory: Negative for cough and shortness of breath. Cardiovascular: Negative for chest pain and palpitations. Gastrointestinal: Negative for abdominal pain, nausea and vomiting. Endocrine: Negative for polydipsia and polyuria. Genitourinary: Negative for dysuria and flank pain. Musculoskeletal: Negative for back pain and neck pain. Skin: Negative for pallor and wound. Neurological: Positive for weakness, light-headedness and headaches. Negative for facial asymmetry. Psychiatric/Behavioral: Negative for confusion. Except as noted above the remainder of the review of systems was reviewed and negative. PAST MEDICAL HISTORY     Past Medical History:   Diagnosis Date    Arthritis     CAD (coronary artery disease) 6/11/2012    s/p lima-lad svg-cx    H/O 24 hour EKG monitoring 6/11/2012    predominant sinus rhythm infrequent ventricular and supraventricular ectopy noted    H/O cardiac catheterization 6/16/2012    LM distal 70 LAD no disease CX no disease RCA dominant no disease LV ef 54    H/O cardiovascular stress test 7/2/19,6/12, 12/12/13    Normal Study negative for ischemia.  Normal LV size and systolic Smokeless tobacco: Never Used   Vaping Use    Vaping Use: Never used   Substance and Sexual Activity    Alcohol use: Yes     Comment:  occasionally has 1 beer    Drug use: No    Sexual activity: None   Other Topics Concern    None   Social History Narrative    None     Social Determinants of Health     Financial Resource Strain:     Difficulty of Paying Living Expenses:    Food Insecurity:     Worried About Running Out of Food in the Last Year:     Ran Out of Food in the Last Year:    Transportation Needs:     Lack of Transportation (Medical):  Lack of Transportation (Non-Medical):    Physical Activity:     Days of Exercise per Week:     Minutes of Exercise per Session:    Stress:     Feeling of Stress :    Social Connections:     Frequency of Communication with Friends and Family:     Frequency of Social Gatherings with Friends and Family:     Attends [employer] Services:     Active Member of Clubs or Organizations:     Attends Club or Organization Meetings:     Marital Status:    Intimate Partner Violence:     Fear of Current or Ex-Partner:     Emotionally Abused:     Physically Abused:     Sexually Abused:        SCREENINGS   NIH Stroke Scale  Interval: Baseline  Level of Consciousness (1a. ): Alert  LOC Questions (1b. ):  Answers both correctly  LOC Commands (1c. ): Performs both tasks correctly  Best Gaze (2. ): Normal  Visual (3. ): No visual loss  Facial Palsy (4. ): Normal symmetrical movement  Motor Arm, Left (5a. ): Drift, but does not hit bed  Motor Arm, Right (5b. ): No movement  Motor Leg, Left (6a. ): No drift  Motor Leg, Right (6b. ): No drift  Limb Ataxia (7. ): Absent  Sensory (8. ): Normal  Best Language (9. ): No aphasia  Dysarthria (10. ): Normal  Extinction and Inattention (11): No abnormality  Total: 5Glasgow Coma Scale  Eye Opening: Spontaneous  Best Verbal Response: Oriented  Best Motor Response: Obeys commands  Arnaud Coma Scale Score: 15          PHYSICAL EXAM (up to 7 for level 4, 8 or more for level 5)     ED Triage Vitals [06/06/21 1427]   BP Temp Temp Source Pulse Resp SpO2 Height Weight   117/76 98.1 °F (36.7 °C) Oral 100 24 94 % 5' 8\" (1.727 m) 235 lb (106.6 kg)       Physical Exam  Vitals reviewed. Constitutional:       General: He is not in acute distress. Appearance: He is not diaphoretic. HENT:      Head: Normocephalic. Mouth/Throat:      Pharynx: Oropharynx is clear. Eyes:      General: No scleral icterus. Pupils: Pupils are equal.   Cardiovascular:      Rate and Rhythm: Normal rate. Heart sounds: No friction rub. No gallop. Pulmonary:      Effort: Pulmonary effort is normal. No respiratory distress. Chest:      Chest wall: No tenderness. Abdominal:      Palpations: Abdomen is soft. Tenderness: There is no abdominal tenderness. There is no guarding. Musculoskeletal:         General: No swelling or tenderness. Normal range of motion. Cervical back: Normal range of motion and neck supple. Lymphadenopathy:      Cervical: No cervical adenopathy. Skin:     Capillary Refill: Capillary refill takes less than 2 seconds. Neurological:      Mental Status: He is alert. Cranial Nerves: No cranial nerve deficit or dysarthria.       Comments: Right arm weakness  Face symmetric, speech clear and fluent, no left arm or left leg weakness         DIAGNOSTIC RESULTS     Labs Reviewed   COMPREHENSIVE METABOLIC PANEL W/ REFLEX TO MG FOR LOW K - Abnormal; Notable for the following components:       Result Value    Total Protein 6.2 (*)     ALT 73 (*)     AST 46 (*)     Anion Gap 3 (*)     All other components within normal limits   CBC WITH AUTO DIFFERENTIAL - Abnormal; Notable for the following components:    RBC 3.90 (*)     Hemoglobin 12.0 (*)     Hematocrit 35.5 (*)     Monocytes % 10.7 (*)     All other components within normal limits   MAGNESIUM - Abnormal; Notable for the following components:    Magnesium 1.7 (*)     All other components within normal limits   TROPONIN   BRAIN NATRIURETIC PEPTIDE          EKG: All EKG's are interpreted by the Emergency Department Physician who either signs or Co-signs this chart in the absence of a cardiologist.       EKG Interpretation    Interpreted by emergency department physician    EKG is interpreted by me. EKG shows sinus rhythm at 90 bpm, axis is nondeviated, no remarkable ST segmentations or depressions, T waves. Before the precordial leads, DE interval 154, QRS duration of 90, QTC of 4-59. Final impression, nonspecific EKG. Radhames Ventura MD     RADIOLOGY:     Non-plain film images such as CT, Ultrasound and MRI are read by the radiologist. Plain radiographic images are visualized and preliminarily interpreted by the emergency physician. Interpretation per the Radiologist below, if available at the time of this note:     [facility]   Final Result   1. No acute arterial abnormality or hemodynamically significant arterial   stenosis in the head or neck. 2. Multiple noncalcified pulmonary nodules measuring up to 6.5 mm in   diameter. See follow-up recommendations below. 3. Mild emphysema. 4. Moderate to severe degenerative changes in the cervical spine greatest at   C5-6 and C6-7. RECOMMENDATIONS:   Multiple pulmonary nodules. Most severe: 7 mm right solid pulmonary nodule. Recommend a non-contrast Chest CT at 3-6 months, then another non-contrast   Chest CT at 18-24 months. These guidelines do not apply to immunocompromised patients and patients with   cancer. Follow up in patients with significant comorbidities as clinically   warranted. For lung cancer screening, adhere to Lung-RADS guidelines. Reference: Radiology. 2017; 284(1):228-43. XR CHEST PORTABLE   Final Result   No acute process. CT HEAD WO CONTRAST   Final Result   No acute intracranial abnormality.                ED BEDSIDE ULTRASOUND:   Performed by ED Physician Sofi Acuna MD       LABS:  Labs Reviewed   COMPREHENSIVE METABOLIC PANEL W/ REFLEX TO MG FOR LOW K - Abnormal; Notable for the following components:       Result Value    Total Protein 6.2 (*)     ALT 73 (*)     AST 46 (*)     Anion Gap 3 (*)     All other components within normal limits   CBC WITH AUTO DIFFERENTIAL - Abnormal; Notable for the following components:    RBC 3.90 (*)     Hemoglobin 12.0 (*)     Hematocrit 35.5 (*)     Monocytes % 10.7 (*)     All other components within normal limits   MAGNESIUM - Abnormal; Notable for the following components:    Magnesium 1.7 (*)     All other components within normal limits   TROPONIN   BRAIN NATRIURETIC PEPTIDE       All other labs were within normal range or not returned as of this dictation. EMERGENCY DEPARTMENT COURSE and DIFFERENTIAL DIAGNOSIS/MDM:   Vitals:    Vitals:    06/06/21 1430 06/06/21 1500 06/06/21 1600 06/06/21 1630   BP: 102/80 129/80  (!) 134/98   Pulse: 99 93 82 86   Resp: 16 16 15    Temp:       TempSrc:       SpO2: 97% 98% 98%    Weight:       Height:               MDM  Number of Diagnoses or Management Options  Right arm weakness  Diagnosis management comments: 78 yom presents with weakness and right arm weakness. He has a history of coronary artery disease status post CABG he is also a history of TIA. He has been    In assessment right leg weakness. He is working outside doing some lawn mowing when he stopped for rest and his wife gave him a nitro because she was concerned. Is not having any significant chest pain at this time. Vermilion Meaghan is a very the nitro he developed some weakness and reported gibberish speaking. EMS was called to scene. Brought to the emergency department for evaluation. He was given some fluids by EMS with improvement in his blood pressure. He presented to the emergency department with mildly low blood pressure but otherwise unremarkable vitals. Did appear to be moving all extremities spontaneously. While emergency department, he did develop some weakness in his right arm. We did call a stroke alert. He had received a CT head prior to that was unremarkable. His labs were also overall unremarkable. CT was then obtained and was negative for acute process. He was seen by [facility] neurologist on-call stroke. Also neurologist felt that the onset of symptoms was more about noon. There is no evidence of large vessel occlusion so is not a TPA candidate. This time he will be admitted to [facility] for MRI, neuro consult and further evaluation and management. He is agreeable plan of care. -  Patient seen and evaluated in the emergency department. -  Triage and nursing notes reviewed and incorporated. -  Old chart records reviewed and incorporated. -  Work-up included:  See above  -  Results discussed with patient. REASSESSMENT          CRITICAL CARE TIME       Total critical care time today provided was 35 minutes. This excludes seperately billable procedure. Critical care time provided for right arm weakness, stroke protocol that required close evaluation and/or intervention with concern for patient decompensation. This excludes seperately billable procedures and family discussion time. Critical care time provided for obtaining history, conducting a physical exam, performing and monitoring interventions, ordering, collecting and interpreting tests, and establishing medical decision-making. There was a potential for life/limb threatening pathology requiring close evaluation and intervention with concern for patient decompensation. CONSULTS:  None    PROCEDURES:  None performed unless otherwise noted below     Procedures        FINAL IMPRESSION      1. Right arm weakness          DISPOSITION/PLAN   DISPOSITION Decision To Transfer 06/06/2021 05:38:51 PM      PATIENT REFERRED TO:  No follow-up provider specified.     DISCHARGE MEDICATIONS:  New Prescriptions    No

## 2021-06-06 NOTE — ED NOTES
Patient's son asks multiple staff members if pt is getting another bag of IV fluids. Dr. Gricelda Solano states patient's vital signs are fine and give pt something to drink. Entered pt room to give him a drink and explained to pt son what the physician thought and she thought he would like something to drink.   Pt drinking water, patient's son states that the fluids were started by the ems so are they going to be billed for fluids from ems and hospital.  Informed pt son that the ems started fluids but she received the rest of the bag from the emergency department and he would only be billed for one bag     Jaleel Mahan RN  06/06/21 5171

## 2021-06-06 NOTE — ED TRIAGE NOTES
Arrived per [facility] to room 1 for triage. Tolerated without difficulty. Bed in lowest position. Call light given. Gowned for exam. Monitor applied. EKG obtained.

## 2021-06-06 NOTE — ED NOTES
Patient:   RUFINO SETH            MRN: [id number]            FIN: [id number]               Age:   77 years     Sex:  FEMALE     :  39   Associated Diagnoses:   None   Author:   SCOT, GENE      76 y/o with chronic diastolic heart failue, sleep apnea and hypoxia.  Was feeling more SOB over past few days.  No URI sx, cough or CP.   Was seen by Dr Ng a few weeks ago who was concerned she was decompensated CHF but HF clinic felt ok.    Has some occas anxiety.    PROBLEM LIST  (F34.1)  Anxiety depression ([id number]) (Current)  (I48.0)  Atrial fibrillation ([id number]) (Current)  (I10)  Benign hypertension ([id number]) (Current)  (I50.32)  Chronic diastolic heart failure ([id number]) (Current)  (E11.9)  Diabetes mellitus type 2 (Current)  (H35.32)  Exudative age-related macular degeneration - had Eylea (Current)  (K21.9)  Gastro-esophageal reflux (Current)  (H40.9)  Glaucoma (Current)  (R09.02)  Hypoxemia ([id number]) (Current)  (E66.01)  Morbid obesity (Current)  (G47.33)  Obstructive sleep apnea ([id number]) (Current)  (K91.5)  Postcholecystectomy syndrome (Current)    ALLERGIES   Xarelto  iodine  Statins-Hmg-Coa Reductase Inhibitors  Fluorescein  Penicillins  Tetanus    LONG-TERM MEDICATIONS   Glucosamine 500 mg Tab  LANTUS SOLOSTAR PEN INJ 5 X 3ML- , USE 65 UNITS EVERY DAY  GLIMEPIRIDE 2MG TABLETS- , TAKE 1 TABLET BY MOUTH TWICE DAILY  Xalatan 0.005 % Eye Drops  Aspir-81 81 mg Tab- 1 tablet(s), 1 time(s) per day  omeprazole 40 mg capsule,delayed release- 1 capsule(s), 1 time(s) per day  pilocarpine 1 % eye drops-   timolol 0.5 % eye gel forming solution-   PreserVision AREDS 2 (with omega-3) 250 mg-2.5 mg-0.5 mg capsule  lisinopril 5 mg tablet- 1 tablet(s), 1 time(s) per day- stopped due to increased Creat  furosemide 40 mg tablet- 1 tablet(s), 1 time(s) per day, as needed  Cardizem  mg capsule,extended release- 1 capsule(s), 2 time(s) per day  flecainide 100 mg  Pt and family educated on [address] and the family was inquiring about the side effects. Son on his cell phone looking up the side effects of medication. Updated family on waiting on bed at [facility], pt family asks about going to [facility]. Informed family that [facility] is not an option d/t lateral move and wife states if we take him to front door they will take him.        Jaleel Mahan RN  06/06/21 8597 tablet- 1 tablet(s), 2 time(s) per day  atorvastatin 40 mg tablet- 1 tablet(s), 1 time(s) per day  trazodone 50 mg tablet- , 1 qhs  Eliquis 5 mg tablet- 1 tablet(s), 2 time(s) per day     SURGICAL HISTORY  Cholecystectomy  CANDE/BSO; Comment: with partial bowel resection due to endometriosis and fibroids    SOCIAL HISTORY  retired hairdresser   - 3 dtrs    HABITS  Non-drinker  Smoking status: Former smoker [[id number]]    FAMILY HISTORY      (I50.9) CHF - Congestive heart failure (Mother); Relative  from this condition   (I10) Benign essential hypertension  (Mother)   (I50.9) CHF - Congestive heart failure (Father); Relative  from this condition     ROS: full ROS negative except as in HPI    PE   A&O x3      Vitals between:   2018 14:51:52   TO   2018 14:51:52                   LAST RESULT MINIMUM MAXIMUM  Temperature 35.8 35.8 36.9  Heart Rate 51 51 68  Respiratory Rate 18 18 24  NISBP           127 121 156  NIDBP           47 42 93  NIMBP           74 71 102  SpO2                    95 92 98    Neck supple  Lungs clear, no wheezing  Cor Reg nl S1 S2 no S3  Abd obese nontender  Ext trace edema      Labs between:  2018 14:57 to 2018 14:57    CBC:                 WBC  HgB  Hct  Plt  MCV  RDW   2018 9.2  (L) 9.2  (L) 29.4  244  92.7  14.9   2018 9.4  (L) 8.8  (L) 28.0  247  92.4  15.0     DIFF:                 Seg  Neutroph//ABS  Lymph//ABS  Mono//ABS  EOS/ABS   2018 NOT APPLICABLE  75 // 6.9 14 // 1.3 6 // 0.5 5 // 0.5  2018 NOT APPLICABLE  73 // 6.8 18 // 1.6 4 // 0.4 5 // 0.5    BMP:                 Na  Cl  BUN  Glu   2018 138  100  (H) 23  (H) 170                              K  CO2  Cr  Ca                              3.7  (H) 35  0.81  8.6   BMP:                 Na  Cl  BUN  Glu   2018 138  99  (H) 31  (H) 224                              K  CO2  Cr  Ca                              4.1  (H) 35  (H) 0.99  8.7     POC GLU:                  Latest Result  Latest Date  Minimum  Min Date  Maximum  Max Date                             (H) 186  13-JUL-2018 (H) 186  13-JUL-2018 (H) 177  12-JUL-2018    COAG:                 INR  PT  PTT  Ddimer  Fibrinogen    12-JUL-2018 1.1  11.4                            Result title:  XR CHEST 2V  Result status:  Final  Verified by:  MIKA BRADEN on 07/12/2018 9:01  FINDINGS: The lungs are clear.  There are no airspace infiltrates or pleural effusions.  Heart size and pulmonary vasculature are normal.  There is mild curvature of thoracic spine with convexity toward the right  IMPRESSION: No active cardiopulmonary disease.    A/P  Dyspnea  Chronic diastolic Heart Failure  Chronic Respiratory Failure with hypoxia  Obstructive Sleep Apnea  -is not clinically in decompensated heart failure  -pulm to evaluate, may benefit from inhalers  -RVP pending but clinically no acute infection    Diabetes Mellitus type 2  -continue inhaler    Morbid obesity  -doing weight watchers    DVT prophy- on Eliquis for afib    DISCHARGE SUMMARY    Seen by pulmonary, no acute issues or change in therapy.  Her O2 requirements are at baseline and all labs normal.  Although RVP not back, unlikely she has an acute resp illness.  Will discharge home, will do outpatient pulmonary rehab.    DX  Chronic Respiratory Failure with hypoxia  Chronic diastolic heart failure, compensated  Diabetes Mellitus type 2  Obstructive sleep apnea, severe  Morbid obesity  Atrial Fibrillation    f/u Dr Ng and DR Decker  Resume home medication regimen            Electronically Signed On 07/13/2018 18:25  __________________________________________________   GENE ELLISON

## 2021-06-07 ENCOUNTER — HOSPITAL ENCOUNTER (INPATIENT)
Age: 66
LOS: 3 days | Discharge: HOME | DRG: 312 | End: 2021-06-10
Attending: INTERNAL MEDICINE | Admitting: INTERNAL MEDICINE
Payer: MEDICARE

## 2021-06-07 VITALS
HEIGHT: 68 IN | TEMPERATURE: 98.1 F | WEIGHT: 235 LBS | DIASTOLIC BLOOD PRESSURE: 75 MMHG | BODY MASS INDEX: 35.61 KG/M2 | SYSTOLIC BLOOD PRESSURE: 107 MMHG | RESPIRATION RATE: 19 BRPM | OXYGEN SATURATION: 92 % | HEART RATE: 90 BPM

## 2021-06-07 DIAGNOSIS — M54.12 CERVICAL MYELOPATHY WITH CERVICAL RADICULOPATHY (HCC): Primary | ICD-10-CM

## 2021-06-07 DIAGNOSIS — I63.9 ACUTE CVA (CEREBROVASCULAR ACCIDENT) (HCC): ICD-10-CM

## 2021-06-07 DIAGNOSIS — G95.9 CERVICAL MYELOPATHY WITH CERVICAL RADICULOPATHY (HCC): Primary | ICD-10-CM

## 2021-06-07 LAB
ANION GAP SERPL CALCULATED.3IONS-SCNC: 9 MMOL/L (ref 4–16)
BASE EXCESS MIXED: 0.4 (ref 0–1.2)
BASE EXCESS: ABNORMAL (ref 0–3.3)
BASOPHILS ABSOLUTE: 0 K/CU MM
BASOPHILS RELATIVE PERCENT: 0.3 % (ref 0–1)
BUN BLDV-MCNC: 11 MG/DL (ref 6–23)
CALCIUM SERPL-MCNC: 9.3 MG/DL (ref 8.3–10.6)
CHLORIDE BLD-SCNC: 105 MMOL/L (ref 99–110)
CO2 CONTENT: 26.5 MMOL/L (ref 19–24)
CO2: 22 MMOL/L (ref 21–32)
CREAT SERPL-MCNC: 0.9 MG/DL (ref 0.9–1.3)
DIFFERENTIAL TYPE: ABNORMAL
EOSINOPHILS ABSOLUTE: 0.2 K/CU MM
EOSINOPHILS RELATIVE PERCENT: 2.2 % (ref 0–3)
GFR AFRICAN AMERICAN: >60 ML/MIN/1.73M2
GFR NON-AFRICAN AMERICAN: >60 ML/MIN/1.73M2
GLUCOSE BLD-MCNC: 103 MG/DL (ref 70–99)
HCO3 VENOUS: 25.2 MMOL/L (ref 19–25)
HCT VFR BLD CALC: 40.4 % (ref 42–52)
HEMOGLOBIN: 13.4 GM/DL (ref 13.5–18)
IMMATURE NEUTROPHIL %: 0.4 % (ref 0–0.43)
LACTATE: 2.1 MMOL/L (ref 0.4–2)
LYMPHOCYTES ABSOLUTE: 2.8 K/CU MM
LYMPHOCYTES RELATIVE PERCENT: 25.6 % (ref 24–44)
MCH RBC QN AUTO: 30.7 PG (ref 27–31)
MCHC RBC AUTO-ENTMCNC: 33.2 % (ref 32–36)
MCV RBC AUTO: 92.4 FL (ref 78–100)
MONOCYTES ABSOLUTE: 1 K/CU MM
MONOCYTES RELATIVE PERCENT: 8.8 % (ref 0–4)
O2 SAT, VEN: 90.1 % (ref 50–70)
PCO2, VEN: 40.6 MMHG (ref 38–52)
PDW BLD-RTO: 12.5 % (ref 11.7–14.9)
PH VENOUS: 7.4 (ref 7.32–7.42)
PLATELET # BLD: 235 K/CU MM (ref 140–440)
PMV BLD AUTO: 9.8 FL (ref 7.5–11.1)
PO2, VEN: 58.8 MMHG (ref 28–48)
POTASSIUM SERPL-SCNC: 4 MMOL/L (ref 3.5–5.1)
RBC # BLD: 4.37 M/CU MM (ref 4.6–6.2)
SEGMENTED NEUTROPHILS ABSOLUTE COUNT: 6.8 K/CU MM
SEGMENTED NEUTROPHILS RELATIVE PERCENT: 62.7 % (ref 36–66)
SODIUM BLD-SCNC: 136 MMOL/L (ref 135–145)
SOURCE, BLOOD GAS: ABNORMAL
TOTAL IMMATURE NEUTOROPHIL: 0.04 K/CU MM
TROPONIN T: <0.01 NG/ML
WBC # BLD: 10.9 K/CU MM (ref 4–10.5)

## 2021-06-07 PROCEDURE — 6370000000 HC RX 637 (ALT 250 FOR IP): Performed by: INTERNAL MEDICINE

## 2021-06-07 PROCEDURE — 85025 COMPLETE CBC W/AUTO DIFF WBC: CPT

## 2021-06-07 PROCEDURE — 1200000000 HC SEMI PRIVATE

## 2021-06-07 PROCEDURE — 82805 BLOOD GASES W/O2 SATURATION: CPT

## 2021-06-07 PROCEDURE — 2580000003 HC RX 258: Performed by: INTERNAL MEDICINE

## 2021-06-07 PROCEDURE — 84484 ASSAY OF TROPONIN QUANT: CPT

## 2021-06-07 PROCEDURE — 83605 ASSAY OF LACTIC ACID: CPT

## 2021-06-07 PROCEDURE — 94761 N-INVAS EAR/PLS OXIMETRY MLT: CPT

## 2021-06-07 PROCEDURE — 80048 BASIC METABOLIC PNL TOTAL CA: CPT

## 2021-06-07 PROCEDURE — 6360000002 HC RX W HCPCS: Performed by: INTERNAL MEDICINE

## 2021-06-07 RX ORDER — IBUPROFEN 400 MG/1
400 TABLET ORAL 2 TIMES DAILY PRN
Status: DISCONTINUED | OUTPATIENT
Start: 2021-06-07 | End: 2021-06-09

## 2021-06-07 RX ORDER — SODIUM CHLORIDE 0.9 % (FLUSH) 0.9 %
5-40 SYRINGE (ML) INJECTION EVERY 12 HOURS SCHEDULED
Status: DISCONTINUED | OUTPATIENT
Start: 2021-06-07 | End: 2021-06-10 | Stop reason: HOSPADM

## 2021-06-07 RX ORDER — LISINOPRIL 10 MG/1
10 TABLET ORAL DAILY
Status: DISCONTINUED | OUTPATIENT
Start: 2021-06-08 | End: 2021-06-10 | Stop reason: HOSPADM

## 2021-06-07 RX ORDER — ASPIRIN 300 MG/1
300 SUPPOSITORY RECTAL DAILY
Status: DISCONTINUED | OUTPATIENT
Start: 2021-06-07 | End: 2021-06-10 | Stop reason: HOSPADM

## 2021-06-07 RX ORDER — LISINOPRIL 10 MG/1
10 TABLET ORAL EVERY EVENING
Status: DISCONTINUED | OUTPATIENT
Start: 2021-06-07 | End: 2021-06-07

## 2021-06-07 RX ORDER — ONDANSETRON 2 MG/ML
4 INJECTION INTRAMUSCULAR; INTRAVENOUS EVERY 6 HOURS PRN
Status: DISCONTINUED | OUTPATIENT
Start: 2021-06-07 | End: 2021-06-10 | Stop reason: HOSPADM

## 2021-06-07 RX ORDER — ONDANSETRON 4 MG/1
4 TABLET, ORALLY DISINTEGRATING ORAL EVERY 8 HOURS PRN
Status: DISCONTINUED | OUTPATIENT
Start: 2021-06-07 | End: 2021-06-10 | Stop reason: HOSPADM

## 2021-06-07 RX ORDER — LISINOPRIL 10 MG/1
10 TABLET ORAL EVERY EVENING
Status: DISCONTINUED | OUTPATIENT
Start: 2021-06-07 | End: 2021-06-07 | Stop reason: HOSPADM

## 2021-06-07 RX ORDER — ATORVASTATIN CALCIUM 40 MG/1
40 TABLET, FILM COATED ORAL NIGHTLY
Status: DISCONTINUED | OUTPATIENT
Start: 2021-06-07 | End: 2021-06-07 | Stop reason: HOSPADM

## 2021-06-07 RX ORDER — SODIUM CHLORIDE 9 MG/ML
25 INJECTION, SOLUTION INTRAVENOUS PRN
Status: DISCONTINUED | OUTPATIENT
Start: 2021-06-07 | End: 2021-06-10 | Stop reason: HOSPADM

## 2021-06-07 RX ORDER — ATORVASTATIN CALCIUM 40 MG/1
40 TABLET, FILM COATED ORAL NIGHTLY
Status: DISCONTINUED | OUTPATIENT
Start: 2021-06-08 | End: 2021-06-10 | Stop reason: HOSPADM

## 2021-06-07 RX ORDER — LEVOTHYROXINE SODIUM 137 UG/1
137 TABLET ORAL DAILY
Status: DISCONTINUED | OUTPATIENT
Start: 2021-06-07 | End: 2021-06-07 | Stop reason: HOSPADM

## 2021-06-07 RX ORDER — SODIUM CHLORIDE 9 MG/ML
1000 INJECTION, SOLUTION INTRAVENOUS CONTINUOUS
Status: DISCONTINUED | OUTPATIENT
Start: 2021-06-07 | End: 2021-06-07 | Stop reason: HOSPADM

## 2021-06-07 RX ORDER — ASPIRIN 81 MG/1
81 TABLET ORAL DAILY
Status: DISCONTINUED | OUTPATIENT
Start: 2021-06-08 | End: 2021-06-07

## 2021-06-07 RX ORDER — ASPIRIN 81 MG/1
81 TABLET ORAL DAILY
Status: DISCONTINUED | OUTPATIENT
Start: 2021-06-07 | End: 2021-06-10 | Stop reason: HOSPADM

## 2021-06-07 RX ORDER — SODIUM CHLORIDE 0.9 % (FLUSH) 0.9 %
5-40 SYRINGE (ML) INJECTION PRN
Status: DISCONTINUED | OUTPATIENT
Start: 2021-06-07 | End: 2021-06-10 | Stop reason: HOSPADM

## 2021-06-07 RX ORDER — POLYETHYLENE GLYCOL 3350 17 G/17G
17 POWDER, FOR SOLUTION ORAL DAILY PRN
Status: DISCONTINUED | OUTPATIENT
Start: 2021-06-07 | End: 2021-06-10 | Stop reason: HOSPADM

## 2021-06-07 RX ORDER — ASPIRIN 81 MG/1
81 TABLET ORAL DAILY
Status: DISCONTINUED | OUTPATIENT
Start: 2021-06-07 | End: 2021-06-07 | Stop reason: HOSPADM

## 2021-06-07 RX ADMIN — SODIUM CHLORIDE, PRESERVATIVE FREE 10 ML: 5 INJECTION INTRAVENOUS at 22:27

## 2021-06-07 RX ADMIN — ASPIRIN 81 MG: 81 TABLET, COATED ORAL at 17:03

## 2021-06-07 RX ADMIN — LISINOPRIL 10 MG: 10 TABLET ORAL at 17:04

## 2021-06-07 RX ADMIN — SODIUM CHLORIDE 1000 ML: 9 INJECTION, SOLUTION INTRAVENOUS at 14:12

## 2021-06-07 RX ADMIN — ASPIRIN 81 MG: 81 TABLET, COATED ORAL at 22:27

## 2021-06-07 RX ADMIN — LEVOTHYROXINE SODIUM 137 MCG: 137 TABLET ORAL at 17:03

## 2021-06-07 RX ADMIN — ENOXAPARIN SODIUM 40 MG: 40 INJECTION SUBCUTANEOUS at 22:27

## 2021-06-07 ASSESSMENT — PAIN DESCRIPTION - LOCATION: LOCATION: NECK;SHOULDER

## 2021-06-07 ASSESSMENT — PAIN SCALES - GENERAL
PAINLEVEL_OUTOF10: 0
PAINLEVEL_OUTOF10: 3

## 2021-06-07 ASSESSMENT — PAIN DESCRIPTION - PAIN TYPE: TYPE: CHRONIC PAIN

## 2021-06-07 NOTE — H&P
HISTORY AND PHYSICAL  (Hospitalist, Internal Medicine)  IDENTIFYING INFORMATION   PATIENT:  Hany Guy  MRN:  [id number]  ADMIT DATE: 6/7/2021      CHIEF COMPLAINT   Patient transferred from [facility] for CVA evaluation    HISTORY OF PRESENT ILLNESS   Hany Guy is a 78 y.o. male with coronary artery disease s/p CABG-6/2012, hypothyroidism, hyperlipidemia, history of TIA, sciatica, obstructive sleep apnea on CPAP, history of shingles 2/2021 presented to [facility] ED-6/6/2021 after the patient was found to be confused at home. Patient is currently awake, alert, oriented. He reported that he was working in his yard on the day of admission. Patient felt lightheaded, woozy, diaphoretic after working in the yard. Patient noted that he was not looking right, and gave him a dose of nitro. Patient also reported that he had brief blurry vision (described as sight broken up in pieces). Patient could not recall the events after taking the nitro. As per the information patient was noted to be confused and wife called EMS. Patient was noted to be hypotensive. Patient was given IV fluids by EMS. While in the ER patient was noted to have right-sided weakness. A stroke alert was called and patient was evaluated by [facility] stroke team.  Patient was deemed not a TPA candidate as he was out of TPA window(neurologist felt that the onset of symptoms were more in the afternoon). Patient is transferred to [facility] for further evaluation. Vitals on initial ED visit-/76, , RR 98.1, saturating 94% on room air. BMP within normal limits, ALT 73, AST 46, troponin less than 0.010. CT head-no acute process. CTA head and neck-no large vessel occlusion, moderate to severe degenerative changes in the cervical spine greatest at C5-C6, C6-C7.     PAST MEDICAL HISTORY PAST SURGICAL HISTORY   coronary artery disease s/p CABG-6/2012, hypothyroidism, hyperlipidemia, history of TIA, sciatica, obstructive sleep apnea on CPAP, history of shingles 2/2021   CABG, appendectomy, cholecystectomy, right inguinal hernia repair   FAMILY HISTORY SOCIAL HISTORY    Lung cancer in father, CHF in mother, sister had throat cancer, brother CVA, COPD  Quit smoking-2010, denied any alcohol or illicit drug abuse   MEDICATIONS ALLERGIES    Reviewed medications with patient-atorvastatin 40 mg daily, Synthroid 137 MCG daily, lisinopril 10 mg daily, diclofenac 75 mg twice daily, aspirin 81 mg daily   Plavix, simvastatin       PAST MEDICAL, SURGICAL, FAMILY, and SOCIAL HISTORY         Past Medical History:   Diagnosis Date    Arthritis     CAD (coronary artery disease) 6/11/2012    s/p lima-lad svg-cx    H/O 24 hour EKG monitoring 6/11/2012    predominant sinus rhythm infrequent ventricular and supraventricular ectopy noted    H/O cardiac catheterization 6/16/2012    LM distal 70 LAD no disease CX no disease RCA dominant no disease LV ef 55    H/O cardiovascular stress test 7/2/19,6/12, 12/12/13    Normal Study negative for ischemia. Normal LV size and systolic function. Ejection fraction is 56 %. Exercise tolerance is fair for age as patient exercised for 4 minutes on standard [facility] protocol. Normal hemodynamic response to exercise    H/O chest x-ray 6/22/2012    stable opacification of the LLL consistent with a small left pleural effusion and atelectasis or pneumonia    H/O Doppler ultrasound 6/16/2012 carotid    mild plaquing in the common carotid arteries and carotid bulb no physiologically significant stenosis is identified by grayscale or duplex techniques in the extracranial carotid systems    H/O echocardiogram 12/31/13, 8/7/2012    Mild left ventr. hypertrophy EF 50-55%    Hashimoto disease     History of CT scan of brain 6/11/2012    no acute intracranial abnormality identified  no significant interval change compared to prior studyt    History of CT scan of chest 6/11/2012    multiple subcentimeter bilateral pulmonary nodules indeterminate Exercise per Week:     Minutes of Exercise per Session:    Stress:     Feeling of Stress :    Social Connections:     Frequency of Communication with Friends and Family:     Frequency of Social Gatherings with Friends and Family:     Attends Confucianist Services:     Active Member of Clubs or Organizations:     Attends Club or Organization Meetings:     Marital Status:    Intimate Partner Violence:     Fear of Current or Ex-Partner:     Emotionally Abused:     Physically Abused:     Sexually Abused:        MEDICATIONS   Medications Prior to Admission  Medications Prior to Admission: nitroGLYCERIN (NITROSTAT) 0.4 MG SL tablet, Place 1 tablet under the tongue every 5 minutes as needed for Chest pain  atorvastatin (LIPITOR) 40 MG tablet, Take 1 tablet by mouth daily  SYNTHROID 137 MCG tablet, TAKE 1 TABLET BY MOUTH EVERY DAY  lisinopril (PRINIVIL;ZESTRIL) 10 MG tablet, Take 10 mg by mouth daily  diclofenac (VOLTAREN) 75 MG EC tablet, Take 75 mg by mouth 2 times daily   aspirin EC 81 MG EC tablet, Take 1 tablet by mouth daily.     Current Medications  Current Facility-Administered Medications   Medication Dose Route Frequency Provider Last Rate Last Admin    sodium chloride flush 0.9 % injection 5-40 mL  5-40 mL Intravenous 2 times per day Eleno Monique MD        sodium chloride flush 0.9 % injection 5-40 mL  5-40 mL Intravenous PRN Eleno Monique MD        0.9 % sodium chloride infusion  25 mL Intravenous PRN Eleno Monique MD        ondansetron (ZOFRAN-ODT) disintegrating tablet 4 mg  4 mg Oral Q8H PRN Eleno Monique MD        Or    ondansetron (ZOFRAN) injection 4 mg  4 mg Intravenous Q6H PRN Eleno Monique MD        polyethylene glycol (GLYCOLAX) packet 17 g  17 g Oral Daily PRN Eleno Monique MD        enoxaparin (LOVENOX) injection 40 mg  40 mg Subcutaneous Daily Eleno Monique MD        aspirin EC tablet 81 mg  81 mg Oral Daily Eleno Monique MD Or    aspirin suppository 300 mg  300 mg Rectal Daily Eleno Monique MD             Allergies  Allergies   Allergen Reactions    Clopidogrel Anaphylaxis     petechiae    Simvastatin Other (See Comments)     myalagia       REVIEW OF SYSTEMS   10 point review of systems conducted and pertinent positives and negatives as per HPI. PHYSICAL EXAM     Wt Readings from Last 3 Encounters:   06/07/21 224 lb 13.9 oz (102 kg)   06/06/21 235 lb (106.6 kg)   12/29/20 232 lb (105.2 kg)       Blood pressure (!) 135/91, pulse 74, temperature 97.7 °F (36.5 °C), temperature source Oral, resp. rate 14, height 5' 8\" (1.727 m), weight 224 lb 13.9 oz (102 kg), SpO2 94 %. GEN  -Awake, alert, NAD.   EYES   -PERRL. HENT  -MM are moist.   RESP  -LS CTA equal bilat, no wheezes, rales or rhonchi. Symmetric chest movement. No respiratory distress noted. C/V  -S1/S2 auscultated. RRR without appreciable M/R/G. No JVD or carotid bruits. Peripheral pulses equal bilaterally and palpable. No peripheral edema. No reproducible chest wall tenderness. GI  -Abdomen is soft, non-distended, no significant tenderness. No masses or guarding. + BS in all quadrants. Rectal exam deferred.   -No CVA tenderness. Younger catheter is not present. MS  -B/L extremities -No gross joint deformities. No swelling, intact sensation symmetrical.   SKIN  -Normal coloration, warm, dry. NEURO  -  NEUROLOGIC EXAM:     Mental Status: A&Ox3, speech clear, language fluent, repetition and naming intact, follows commands appropriately     Cranial Nerve Exam:   CN II-XII:  PERRL, no nystagmus, no gaze paresis, muscles of facial expression symmetric; hearing intact to conversational tone, shoulder elevation symmetric and tongue protrudes midline with movement side to side.      Motor Exam:       Strength 5/5 bilateral upper and lower extremities, No pronator drift     Sensation: Intact light touch UE's/LE's b/l     Coordination/Cerebellum:  Heel-to-Shin: no dysmetria b/l        Gait and stance:      Gait: deferred for safety     PSYC  - Appropriate affect. LABS AND IMAGING     Results for Corine Pacheco (MRN [id number]) as of 6/8/2021 05:43   Ref.  Range 6/7/2021 05:30   Sodium Latest Ref Range: 135 - 145 MMOL/L 136   Potassium Latest Ref Range: 3.5 - 5.1 MMOL/L 4.0   Chloride Latest Ref Range: 99 - 110 mMol/L 105   CO2 Latest Ref Range: 21 - 32 MMOL/L 22   BUN Latest Ref Range: 6 - 23 MG/DL 11   Creatinine Latest Ref Range: 0.9 - 1.3 MG/DL 0.9   Anion Gap Latest Ref Range: 4 - 16  9   GFR Non- Latest Ref Range: >60 mL/min/1.73m2 >60   GFR African American Latest Ref Range: >60 mL/min/1.73m2 >60   Lactate, ser/plas Latest Ref Range: 0.4 - 2.0 mMOL/L 2.1 (HH)   Glucose Latest Ref Range: 70 - 99 MG/ (H)   Calcium Latest Ref Range: 8.3 - 10.6 MG/DL 9.3   Troponin T Latest Ref Range: <0.01 NG/ML <0.010   WBC Latest Ref Range: 4.0 - 10.5 K/CU MM 10.9 (H)   RBC Latest Ref Range: 4.6 - 6.2 M/CU MM 4.37 (L)   Hemoglobin Quant Latest Ref Range: 13.5 - 18.0 GM/DL 13.4 (L)   Hematocrit Latest Ref Range: 42 - 52 % 40.4 (L)   MCV Latest Ref Range: 78 - 100 FL 92.4   MCH Latest Ref Range: 27 - 31 PG 30.7   MCHC Latest Ref Range: 32.0 - 36.0 % 33.2   MPV Latest Ref Range: 7.5 - 11.1 FL 9.8   RDW Latest Ref Range: 11.7 - 14.9 % 12.5   Platelet Count Latest Ref Range: 140 - 440 K/CU    Lymphocyte % Latest Ref Range: 24 - 44 % 25.6   Monocytes % Latest Ref Range: 0 - 4 % 8.8 (H)   Eosinophils % Latest Ref Range: 0 - 3 % 2.2   Basophils % Latest Ref Range: 0 - 1 % 0.3   Lymphocytes Absolute Latest Units: K/CU MM 2.8   Monocytes Absolute Latest Units: K/CU MM 1.0   Eosinophils Absolute Latest Units: K/CU MM 0.2   Basophils Absolute Latest Units: K/CU MM 0.0   Differential Type Unknown AUTOMATED DIFFERENTIAL   Segs Relative Latest Ref Range: 36 - 66 % 62.7   Segs Absolute Latest Units: K/CU MM 6.8   Immature Neutrophil % Latest Ref Range: 0 - 0.43 % 0.4 abnormality.       SOFT TISSUES/SKULL:  No acute abnormality of the visualized skull or soft   tissues.           Impression   No acute intracranial abnormality. CTA HEAD NECK W CONTRAST    Narrative   EXAMINATION:   CTA OF THE HEAD AND NECK WITH CONTRAST 6/6/2021 4:21 pm:       TECHNIQUE:   CTA of the head and neck was performed with the administration of intravenous   contrast. Multiplanar reformatted images are provided for review.  MIP images   are provided for review. Stenosis of the internal carotid arteries measured   using NASCET criteria. Dose modulation, iterative reconstruction, and/or   weight based adjustment of the mA/kV was utilized to reduce the radiation   dose to as low as reasonably achievable.       COMPARISON:   None.       HISTORY:   ORDERING SYSTEM PROVIDED HISTORY: right arm weakness, acute onset   TECHNOLOGIST PROVIDED HISTORY:   Stroke alert is called on patient   Reason for exam:->right arm weakness, acute onset   Decision Support Exception - unselect if not a suspected or confirmed   emergency medical condition->Emergency Medical Condition (MA)   Reason for Exam: STROKE PROTOCOL, sudden onset of RT arm weakness   Acuity: Acute   Type of Exam: Initial   Additional signs and symptoms: STROKE PROTOCOL, sudden onset of RT arm   weakness   Relevant Medical/Surgical History: STROKE PROTOCOL, sudden onset of RT arm   weakness       FINDINGS:       CTA NECK:       AORTIC ARCH/ARCH VESSELS: Is a prior CABG.  No dissection or arterial injury.    No significant stenosis of the brachiocephalic or subclavian arteries.       CAROTID ARTERIES: No dissection, arterial injury, or hemodynamically   significant stenosis by NASCET criteria.       VERTEBRAL ARTERIES: No dissection, arterial injury, or significant stenosis.       SOFT TISSUES: Respiratory motion artifact limits evaluation of the upper   lungs.  Mild emphysema.  Multiple noncalcified pulmonary nodules in the right   lung measuring up to 6.5 mm in diameter.  No cervical or superior mediastinal   lymphadenopathy.  The larynx and pharynx are unremarkable.  No acute   abnormality of the salivary and thyroid glands.       BONES: There is no acute fracture or suspect osseous lesion.  Moderate to   severe spinal canal stenosis at C5-6 and C6-7 secondary to posterior disc   osteophyte complexes lobe.  Multilevel neural foraminal narrowing in the   cervical spine secondary to uncovertebral and facet hypertrophy.           CTA HEAD:       ANTERIOR CIRCULATION: No significant stenosis of the intracranial internal   carotid, anterior cerebral, or middle cerebral arteries. No aneurysm.  1 mm   infundibulum along the dorsal aspect of the supraclinoid right internal   carotid artery at the right posterior communicating artery aneurysm origin.       POSTERIOR CIRCULATION: No significant stenosis of the vertebral, basilar, or   posterior cerebral arteries. No aneurysm.       OTHER: No dural venous sinus thrombosis on this non-dedicated study.       BRAIN: No mass effect or midline shift. No extra-axial fluid collection. The   gray-white differentiation is maintained.           Impression   1. No acute arterial abnormality or hemodynamically significant arterial   stenosis in the head or neck. 2. Multiple noncalcified pulmonary nodules measuring up to 6.5 mm in   diameter.  See follow-up recommendations below. 3. Mild emphysema. 4. Moderate to severe degenerative changes in the cervical spine greatest at   C5-6 and C6-7.       RECOMMENDATIONS:   Multiple pulmonary nodules. Most severe: 7 mm right solid pulmonary nodule. Recommend a non-contrast Chest CT at 3-6 months, then another non-contrast   Chest CT at 18-24 months. These guidelines do not apply to immunocompromised patients and patients with   cancer. Follow up in patients with significant comorbidities as clinically   warranted. For lung cancer screening, adhere to Lung-RADS guidelines.    Reference: Radiology. 2017; 284(1):228-43. XR CHEST PORTABLE  Narrative   EXAMINATION:   ONE XRAY VIEW OF THE CHEST       6/6/2021 3:04 pm       COMPARISON:   Chest radiograph 07/07/2015.       HISTORY:   ORDERING SYSTEM PROVIDED HISTORY: chest pain   TECHNOLOGIST PROVIDED HISTORY:   Reason for exam:->chest pain   Reason for Exam: AMS, headache   Acuity: Acute   Type of Exam: Initial   Additional signs and symptoms: AMS, headache   Relevant Medical/Surgical History: AMS, headache       FINDINGS:   Status post median sternotomy.  The cardiac silhouette is upper limits of   normal in size.  Shallow inspiration.  No pneumothorax, vascular congestion,   consolidation, or pleural effusion is identified.  No acute osseous   abnormality.           Impression   No acute process. Relevant labs and imaging reviewed    ASSESSMENT AND PLAN     #. Strokelike symptoms: Evaluate for TIA/CVA  -Resolved symptoms  -CT head, CTA head and neck-no acute process  -MRI head ordered  -NIHSS/PT/OT/SLP ordered  -Neurology consult. #.  Hypotension: Resolved  -Could be secondary to nitro. #.  Exertional dyspnea/diaphoresis:  -Troponin x2 - negative, EKG with no acute ST-T wave changes  -Repeat EKG  -Consult cardiology    #. coronary artery disease s/p CABG-6/2012  -Patient on aspirin 81 mg daily, atorvastatin 40 mg daily, lisinopril 10 mg daily    #. Hypothyroidism-resume levothyroxine    #. Hyperlipidemia-continue atorvastatin    #. history of TIA    #. Sciatica    #. obstructive sleep apnea on CPAP    #. history of shingles 2/2021    DVT Prophylaxis: Lovenox  GI Prophylaxis: Not indicated  code Status: FULL.     Michelle Paulino MD  Hospitalist, Internal Medicine  6/7/2021 at 6:57 PM

## 2021-06-07 NOTE — PROGRESS NOTES
Spoke with [facility] in [facility] MRI pt with STAT order for MRI od brain . She said to set up transport and send him to MRI and they will work him in.

## 2021-06-07 NOTE — ED NOTES
Rests in bed with eyes closed and resps even and unlabored.      Chon Browning, RN  06/07/21 6338 Medical [facility], RN  06/07/21 6745

## 2021-06-07 NOTE — ED NOTES
Patient going to wait to have MRI on requests of Hospitalist. Wife voiced understanding.       Thong Peña RN  06/07/21 6030

## 2021-06-07 NOTE — PROGRESS NOTES
Donna called and gave order to hold transfer of patient to [facility] for MRI until patient can get a room there.

## 2021-06-07 NOTE — ED NOTES
Continues to rest quietly in the bed. Wife remains at the bedside.      Ramila Moulton RN  06/07/21 0001       Ramila Moulton RN  06/07/21 0001

## 2021-06-07 NOTE — PROGRESS NOTES
[facility] MRI and Nurse Supervisor notified of Hospitalist placing a hold on MRI at this time until pt. Can get room assigned at [address] [facility] . Patient's family here and Mary Kelly RN discussing this Plan with them.

## 2021-06-07 NOTE — ED NOTES
Pt took nighttime meds from home which were Baby aspirin, Lipitor and Voltaren. Dr Jaramillo Fore aware.      Raysa Rubin, BRITTNEY  06/06/21 3061

## 2021-06-08 ENCOUNTER — APPOINTMENT (OUTPATIENT)
Dept: NUCLEAR MEDICINE | Age: 66
DRG: 312 | End: 2021-06-08
Attending: INTERNAL MEDICINE
Payer: MEDICARE

## 2021-06-08 LAB
ALBUMIN SERPL-MCNC: 3.8 GM/DL (ref 3.4–5)
ALP BLD-CCNC: 67 IU/L (ref 40–128)
ALT SERPL-CCNC: 77 U/L (ref 10–40)
ANION GAP SERPL CALCULATED.3IONS-SCNC: 10 MMOL/L (ref 4–16)
AST SERPL-CCNC: 43 IU/L (ref 15–37)
BILIRUB SERPL-MCNC: 0.3 MG/DL (ref 0–1)
BUN BLDV-MCNC: 12 MG/DL (ref 6–23)
CALCIUM SERPL-MCNC: 9.1 MG/DL (ref 8.3–10.6)
CHLORIDE BLD-SCNC: 106 MMOL/L (ref 99–110)
CHOLESTEROL: 155 MG/DL
CO2: 23 MMOL/L (ref 21–32)
CREAT SERPL-MCNC: 0.9 MG/DL (ref 0.9–1.3)
EKG ATRIAL RATE: 80 BPM
EKG DIAGNOSIS: NORMAL
EKG P AXIS: 29 DEGREES
EKG P-R INTERVAL: 174 MS
EKG Q-T INTERVAL: 404 MS
EKG QRS DURATION: 94 MS
EKG QTC CALCULATION (BAZETT): 465 MS
EKG R AXIS: 28 DEGREES
EKG T AXIS: 58 DEGREES
EKG VENTRICULAR RATE: 80 BPM
ESTIMATED AVERAGE GLUCOSE: 123 MG/DL
GFR AFRICAN AMERICAN: >60 ML/MIN/1.73M2
GFR NON-AFRICAN AMERICAN: >60 ML/MIN/1.73M2
GLUCOSE BLD-MCNC: 102 MG/DL (ref 70–99)
HBA1C MFR BLD: 5.9 % (ref 4.2–6.3)
HCT VFR BLD CALC: 41 % (ref 42–52)
HDLC SERPL-MCNC: 36 MG/DL
HEMOGLOBIN: 13.6 GM/DL (ref 13.5–18)
LDL CHOLESTEROL DIRECT: 101 MG/DL
LV EF: 50 %
LV EF: 60 %
LVEF MODALITY: NORMAL
LVEF MODALITY: NORMAL
MCH RBC QN AUTO: 30.7 PG (ref 27–31)
MCHC RBC AUTO-ENTMCNC: 33.2 % (ref 32–36)
MCV RBC AUTO: 92.6 FL (ref 78–100)
PDW BLD-RTO: 12.6 % (ref 11.7–14.9)
PLATELET # BLD: 238 K/CU MM (ref 140–440)
PMV BLD AUTO: 9.9 FL (ref 7.5–11.1)
POTASSIUM SERPL-SCNC: 4.2 MMOL/L (ref 3.5–5.1)
RBC # BLD: 4.43 M/CU MM (ref 4.6–6.2)
SODIUM BLD-SCNC: 139 MMOL/L (ref 135–145)
TOTAL PROTEIN: 6.1 GM/DL (ref 6.4–8.2)
TRIGL SERPL-MCNC: 208 MG/DL
WBC # BLD: 8.5 K/CU MM (ref 4–10.5)

## 2021-06-08 PROCEDURE — 97530 THERAPEUTIC ACTIVITIES: CPT

## 2021-06-08 PROCEDURE — A9500 TC99M SESTAMIBI: HCPCS | Performed by: INTERNAL MEDICINE

## 2021-06-08 PROCEDURE — 36415 COLL VENOUS BLD VENIPUNCTURE: CPT

## 2021-06-08 PROCEDURE — 6370000000 HC RX 637 (ALT 250 FOR IP): Performed by: INTERNAL MEDICINE

## 2021-06-08 PROCEDURE — 97535 SELF CARE MNGMENT TRAINING: CPT

## 2021-06-08 PROCEDURE — 80061 LIPID PANEL: CPT

## 2021-06-08 PROCEDURE — 93306 TTE W/DOPPLER COMPLETE: CPT

## 2021-06-08 PROCEDURE — 93010 ELECTROCARDIOGRAM REPORT: CPT | Performed by: INTERNAL MEDICINE

## 2021-06-08 PROCEDURE — 78452 HT MUSCLE IMAGE SPECT MULT: CPT

## 2021-06-08 PROCEDURE — 80053 COMPREHEN METABOLIC PANEL: CPT

## 2021-06-08 PROCEDURE — 99223 1ST HOSP IP/OBS HIGH 75: CPT | Performed by: PSYCHIATRY & NEUROLOGY

## 2021-06-08 PROCEDURE — 92610 EVALUATE SWALLOWING FUNCTION: CPT

## 2021-06-08 PROCEDURE — 83721 ASSAY OF BLOOD LIPOPROTEIN: CPT

## 2021-06-08 PROCEDURE — 2580000003 HC RX 258: Performed by: INTERNAL MEDICINE

## 2021-06-08 PROCEDURE — 93017 CV STRESS TEST TRACING ONLY: CPT

## 2021-06-08 PROCEDURE — 6360000002 HC RX W HCPCS: Performed by: INTERNAL MEDICINE

## 2021-06-08 PROCEDURE — 93005 ELECTROCARDIOGRAM TRACING: CPT | Performed by: INTERNAL MEDICINE

## 2021-06-08 PROCEDURE — 83036 HEMOGLOBIN GLYCOSYLATED A1C: CPT

## 2021-06-08 PROCEDURE — 99222 1ST HOSP IP/OBS MODERATE 55: CPT | Performed by: INTERNAL MEDICINE

## 2021-06-08 PROCEDURE — 3430000000 HC RX DIAGNOSTIC RADIOPHARMACEUTICAL: Performed by: INTERNAL MEDICINE

## 2021-06-08 PROCEDURE — 1200000000 HC SEMI PRIVATE

## 2021-06-08 PROCEDURE — 97165 OT EVAL LOW COMPLEX 30 MIN: CPT

## 2021-06-08 PROCEDURE — 85027 COMPLETE CBC AUTOMATED: CPT

## 2021-06-08 PROCEDURE — 94761 N-INVAS EAR/PLS OXIMETRY MLT: CPT

## 2021-06-08 RX ADMIN — Medication 30 MILLICURIE: at 14:05

## 2021-06-08 RX ADMIN — LEVOTHYROXINE SODIUM 137 MCG: 25 TABLET ORAL at 08:40

## 2021-06-08 RX ADMIN — REGADENOSON 0.4 MG: 0.08 INJECTION, SOLUTION INTRAVENOUS at 14:04

## 2021-06-08 RX ADMIN — Medication 10 MILLICURIE: at 09:15

## 2021-06-08 RX ADMIN — SODIUM CHLORIDE, PRESERVATIVE FREE 10 ML: 5 INJECTION INTRAVENOUS at 21:37

## 2021-06-08 RX ADMIN — ASPIRIN 81 MG: 81 TABLET, COATED ORAL at 08:41

## 2021-06-08 RX ADMIN — LISINOPRIL 10 MG: 10 TABLET ORAL at 08:41

## 2021-06-08 ASSESSMENT — PAIN SCALES - GENERAL
PAINLEVEL_OUTOF10: 0

## 2021-06-08 ASSESSMENT — PAIN DESCRIPTION - PAIN TYPE
TYPE: CHRONIC PAIN
TYPE: CHRONIC PAIN

## 2021-06-08 NOTE — PROGRESS NOTES
Walker  Activity: Other  Assist Level: Contact guard assistance  Functional Mobility Comments: Pt completed ~ 250ft of functional mobility in [facility] w/ RW. ADL  Feeding: Independent  Grooming: Contact guard assistance (Pt washed face and combed hair seated upright in chair and completed oral hygiene in stand at sink.)  UE Bathing: Supervision  LE Bathing: Contact guard assistance  UE Dressing: Supervision  LE Dressing: Contact guard assistance  Toileting: Contact guard assistance  Tone RUE  RUE Tone: Normotonic  Tone LUE  LUE Tone: Normotonic  Coordination  Movements Are Fluid And Coordinated: Yes        Transfers  Sit to stand: Stand by assistance  Stand to sit: Stand by assistance  Transfer Comments: no AD required  Vision - Basic Assessment  Prior Vision: Wears glasses only for reading  Visual History: No significant visual history  Patient Visual Report: No visual complaint reported. Cognition  Overall Cognitive Status: WNL  Perception  Overall Perceptual Status: WFL     Sensation  Overall Sensation Status: WNL        LUE AROM (degrees)  LUE AROM : WNL  Left Hand AROM (degrees)  Left Hand AROM: WNL  RUE AROM (degrees)  RUE AROM : WNL  RUE General AROM: Pt reports minor discomfort during ROM. Right Hand AROM (degrees)  Right Hand AROM: WNL  LUE Strength  Gross LUE Strength: WNL  L Hand General: 5/5  RUE Strength  Gross RUE Strength: WFL  R Hand General: 4/5       Therapeutic Activity Training:   Therapeutic activity training was instructed today. Cues were given for safety, sequence, UE/LE placement, awareness, and balance. Activities performed today included functional mobility training, stand-sit, sit-stand. Self Care Training:   Cues were given for safety, sequence, UE/LE placement, visual cues, and balance. Activities performed today included oral hygiene and grooming.             Plan   Plan  Times per week: 2x+  Times per day: Daily  Current Treatment Recommendations: Strengthening,

## 2021-06-08 NOTE — PROGRESS NOTES
intake or output data in the 24 hours ending 06/08/21 7893   Vitals:   Vitals:    06/08/21 1600   BP: 117/82   Pulse: 86   Resp: 18   Temp:    SpO2:      Physical Exam:   GEN Awake male, sitting upright in bed in no apparent distress. Appears given age. EYES Pupils are equally round. No scleral erythema, discharge, or conjunctivitis. HENT Mucous membranes are moist. Oral pharynx without exudates, no evidence of thrush. NECK Supple, no apparent thyromegaly or masses. RESP Clear to auscultation, no wheezes, rales or rhonchi. Symmetric chest movement while on room air. CARDIO/VASC S1/S2 auscultated. Regular rate without appreciable murmurs, rubs, or gallops. No JVD or carotid bruits. Peripheral pulses equal bilaterally and palpable. No peripheral edema. GI Abdomen is soft without significant tenderness, masses, or guarding. Bowel sounds are normoactive. Rectal exam deferred.  No costovertebral angle tenderness. Normal appearing external genitalia. Younger catheter is not present. HEME/LYMPH No palpable cervical lymphadenopathy and no hepatosplenomegaly. No petechiae or ecchymoses. MSK No gross joint deformities. SKIN Normal coloration, warm, dry. NEURO Cranial nerves appear grossly intact, normal speech, no lateralizing weakness. PSYCH Awake, alert, oriented x 4. Affect appropriate.     Medications:   Medications:    sodium chloride flush  5-40 mL Intravenous 2 times per day    enoxaparin  40 mg Subcutaneous Daily    aspirin  81 mg Oral Daily    Or    aspirin  300 mg Rectal Daily    atorvastatin  40 mg Oral Nightly    lisinopril  10 mg Oral Daily    levothyroxine  137 mcg Oral QAM AC      Infusions:    sodium chloride       PRN Meds: sodium chloride flush, 5-40 mL, PRN  sodium chloride, 25 mL, PRN  ondansetron, 4 mg, Q8H PRN   Or  ondansetron, 4 mg, Q6H PRN  polyethylene glycol, 17 g, Daily PRN  ibuprofen, 400 mg, BID PRN          Electronically signed by Cristiano Omalley MD on 6/8/2021 at 6:23 PM

## 2021-06-08 NOTE — PROGRESS NOTES
Speech Language Pathology  Facility/Department: [address] [place] [address]   CLINICAL BEDSIDE SWALLOW EVALUATION    NAME: Michael Atwood  : 1955  MRN: [id number]    IMPRESSIONS AND RECOMMENDATIONS: Michael Atwood was referred for a bedside swallow evaluation after being admitted to [facility] with strokelike symptoms, concern for TIA/CVA. MRI pending. Medical hx includes CAD, HLD, hypothyroidism. No known history of dysphagia prior to admission. Pt seen for evaluation seated upright in chair, alert, cooperative. Oral mechanism examination WFL/no asymmetry. He was presented with PO trials of thin liquids via cup/straw, puree, and regular solids. Oral stage WFL with intact mastication, AP transit, oral clearance. Pharyngeal stage appears WFL with intact swallow initiation/laryngeal elevation and no s/s aspiration. Speech/language screened and judged intact. Recommend continued regular diet/thin liquids. No further acute SLP needs identified at this time. ADMISSION DATE: 2021  ADMITTING DIAGNOSIS: has Hyperlipidemia; CAD (coronary artery disease); ASMITA on CPAP; S/P CABG x 2, lima-lad svg-cx 2012; Right inguinal hernia; Skin tags, multiple acquired; Pyogenic arthritis of right knee joint ([address]  Osteoarthritis of right knee; Left shoulder pain; and Acute CVA (cerebrovascular accident) ([address] on their problem list.  ONSET DATE: this admission    Recent Chest Xray/CT of Chest: see chart    Date of Eval: 2021  Evaluating Therapist: MIHIR Durand    Current Diet level:  Current Diet : Regular  Current Liquid Diet : Thin      Primary Complaint  Patient Complaint: no acute speech/swallowing complaints    Pain:  Pain Assessment  Pain Assessment: 0-10  Pain Level: 0  Patient's Stated Pain Goal: No pain  Pain Type: Chronic pain  Pain Location: Neck, Shoulder    Reason for Referral  Michael Atwood was referred for a bedside swallow evaluation to assess the efficiency of his swallow function, identify signs and place       Therapy Time  SLP Individual Minutes  Time In: 6836  Time Out: 9503  Minutes: [place], [facility]-SLP  6/8/2021 10:00 AM
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
## 2021-06-08 NOTE — CONSULTS
CHART REVIEWED  FULL NOTE TO FOLLOW                      Name:  Rahel Steiner /Age/Sex: 1955  (78 y.o. male)   MRN & CSN:  [id number] & [id number] Admission Date/Time: 2021  6:25 PM   Location:  [id number] PCP: Lisa Macias, [address] Day: 2          Referring physician:  Bailey Barrera MD         Reason for consultation:  syncope        Thanks for referral.    Information source: patient    CC;  Passed out      HPI:   Thank you for involving me in taking  care of Rahel Steiner who  is a 78 y. o.year  Old male  Presents with  H/o CABG. HTN , hyperlipidimea now w ith an episode of passing out while working in yard, was working outside in yard, felt dizzy, lightheaded, has no CP now but has been feeling SOB lately,    Initial trop       Is normal.            Past medical history:    has a past medical history of Arthritis, CAD (coronary artery disease), H/O 24 hour EKG monitoring, H/O cardiac catheterization, H/O cardiovascular stress test, H/O chest x-ray, H/O Doppler ultrasound, H/O echocardiogram, Hashimoto disease, History of CT scan of brain, History of CT scan of chest, Hyperlipidemia, Hypothyroidism, Left shoulder pain, Obesity (BMI 30.0-34.9), Osteoarthritis of right knee, Prolonged emergence from general anesthesia, S/P CABG x 2, and TIA (transient ischemic attack). Past surgical history:   has a past surgical history that includes Appendectomy; Cholecystectomy; Coronary artery bypass graft (2012); and Inguinal hernia repair (Right, 2016). Social History:   reports that he quit smoking about 10 years ago. He has a 43.00 pack-year smoking history. He has never used smokeless tobacco. He reports current alcohol use. He reports that he does not use drugs. Family history:  family history includes Cancer in his father and sister; Diabetes in his sister; Heart Disease in his mother; High Cholesterol in his sister; Stroke in his brother and brother.     Allergies Allergen Reactions    Clopidogrel Anaphylaxis     petechiae    Simvastatin Other (See Comments)     myalagia       sodium chloride flush 0.9 % injection 5-40 mL, 2 times per day  sodium chloride flush 0.9 % injection 5-40 mL, PRN  0.9 % sodium chloride infusion, PRN  ondansetron (ZOFRAN-ODT) disintegrating tablet 4 mg, Q8H PRN   Or  ondansetron (ZOFRAN) injection 4 mg, Q6H PRN  polyethylene glycol (GLYCOLAX) packet 17 g, Daily PRN  enoxaparin (LOVENOX) injection 40 mg, Daily  aspirin EC tablet 81 mg, Daily   Or  aspirin suppository 300 mg, Daily  atorvastatin (LIPITOR) tablet 40 mg, Nightly  ibuprofen (ADVIL;MOTRIN) tablet 400 mg, BID PRN  lisinopril (PRINIVIL;ZESTRIL) tablet 10 mg, Daily  levothyroxine (SYNTHROID) tablet 137 mcg, QAM AC      Current Facility-Administered Medications   Medication Dose Route Frequency Provider Last Rate Last Admin    sodium chloride flush 0.9 % injection 5-40 mL  5-40 mL Intravenous 2 times per day Angeles Cisneros MD   10 mL at 06/07/21 2227    sodium chloride flush 0.9 % injection 5-40 mL  5-40 mL Intravenous PRN Angeles Cisneros MD        0.9 % sodium chloride infusion  25 mL Intravenous PRN Angeles Cisneros MD        ondansetron (ZOFRAN-ODT) disintegrating tablet 4 mg  4 mg Oral Q8H PRN Angeles Cisneros MD        Or    ondansetron (ZOFRAN) injection 4 mg  4 mg Intravenous Q6H PRN Angeles Cisneros MD        polyethylene glycol (GLYCOLAX) packet 17 g  17 g Oral Daily PRN Angeles Cisneros MD        enoxaparin (LOVENOX) injection 40 mg  40 mg Subcutaneous Daily Angeles Cisneros MD   40 mg at 06/07/21 2227    aspirin EC tablet 81 mg  81 mg Oral Daily Angeles Cisnreos MD   81 mg at 06/07/21 2227    Or    aspirin suppository 300 mg  300 mg Rectal Daily Angeles Cisneros MD        atorvastatin (LIPITOR) tablet 40 mg  40 mg Oral Nightly Angeles Cisneros MD        ibuprofen (ADVIL;MOTRIN) tablet 400 mg  400 mg Oral BID PRN Maggy Hayley Mejía MD        lisinopril (PRINIVIL;ZESTRIL) tablet 10 mg  10 mg Oral Daily Chi Smart MD        levothyroxine (SYNTHROID) tablet 137 mcg  137 mcg Oral QAM AC Cih Smart MD         Review of Systems:  All 14 systems reviewed, all negative except for  Passing out    Physical Examination:    /76   Pulse 93   Temp 98.1 °F (36.7 °C) (Oral)   Resp 20   Ht 5' 8\" (1.727 m)   Wt 226 lb 10.1 oz (102.8 kg)   SpO2 94%   BMI 34.46 kg/m²      Wt Readings from Last 3 Encounters:   06/08/21 226 lb 10.1 oz (102.8 kg)   06/06/21 235 lb (106.6 kg)   12/29/20 232 lb (105.2 kg)     Body mass index is 34.46 kg/m². General Appearance:  Fair   Head: normocephalic     Eyes: normal, noninjected conjunctiva    ENT: normal mucosa, noninjected throat, normal     NECK: No JVP  No thyromegaly        Cardiovascular: No thrills palpated   Auscultation: Normal S1 and S2,  no murmur   carotid bruit no   Abdominal Aorta no bruit    Respiratory:    Breath sounds Clear = 0    Extremities:  none Edema clubbing ,   no cyanosis    SKIN: Warm and well perfused, no pallor or cyanosis    Vascular exam:  Pedal Pulses: palp  bilaterally        Abdomen:  No masses or tenderness. No organomegaly noted. Neurological:  Oriented to time, place, and person   No focal neurological deficit noted. Psychiatric:normal mood, no anxiety    Lab Review   Recent Labs     06/07/21  0530   WBC 10.9*   HGB 13.4*   HCT 40.4*         Recent Labs     06/07/21  0530      K 4.0      CO2 22   BUN 11   CREATININE 0.9     Recent Labs     06/06/21  1515   AST 46*   ALT 73*   BILITOT 0.5   ALKPHOS 62     No results for input(s): TROPONINI in the last 72 hours.   Lab Results   Component Value Date    BNP 5 06/11/2012    BNP <5.0 03/22/2012     Lab Results   Component Value Date    INR 0.9 12/26/2017    PROTIME 9.7 12/26/2017           Assessment/Recommendations:     - CP; serial trops, EKGs, stress today  H/o CABG  - HTN stable  - Hyperlipidimea on statin         Leisa Martinez MD, 6/8/2021 6:44 AM

## 2021-06-08 NOTE — CARE COORDINATION
Chart reviewed, in to see pt for dc planning. Introduced self and role explained. Pt was admitted for confusion following working in the yard. Pt is currently off the unit for testing and I spoke with his wife/Tomás. Tomás reports they live at home alone, he is completely independent with all needs and pt will return home with her at discharge. Wife/Tomás explained pt follows with PCP, has insurance with affordable RX co-pays and reliable transportation per himself or her. Discussed HHC and pt wife declined [address] need nor any other discharge needs. CM remains available if needs arise.

## 2021-06-09 ENCOUNTER — APPOINTMENT (OUTPATIENT)
Dept: MRI IMAGING | Age: 66
DRG: 312 | End: 2021-06-09
Attending: INTERNAL MEDICINE
Payer: MEDICARE

## 2021-06-09 PROCEDURE — 94761 N-INVAS EAR/PLS OXIMETRY MLT: CPT

## 2021-06-09 PROCEDURE — 70551 MRI BRAIN STEM W/O DYE: CPT

## 2021-06-09 PROCEDURE — 99233 SBSQ HOSP IP/OBS HIGH 50: CPT | Performed by: NURSE PRACTITIONER

## 2021-06-09 PROCEDURE — 97162 PT EVAL MOD COMPLEX 30 MIN: CPT

## 2021-06-09 PROCEDURE — 72141 MRI NECK SPINE W/O DYE: CPT

## 2021-06-09 PROCEDURE — APPSS45 APP SPLIT SHARED TIME 31-45 MINUTES: Performed by: NURSE PRACTITIONER

## 2021-06-09 PROCEDURE — 6370000000 HC RX 637 (ALT 250 FOR IP): Performed by: INTERNAL MEDICINE

## 2021-06-09 PROCEDURE — 99232 SBSQ HOSP IP/OBS MODERATE 35: CPT | Performed by: INTERNAL MEDICINE

## 2021-06-09 PROCEDURE — 1200000000 HC SEMI PRIVATE

## 2021-06-09 PROCEDURE — 97116 GAIT TRAINING THERAPY: CPT

## 2021-06-09 PROCEDURE — 2580000003 HC RX 258: Performed by: INTERNAL MEDICINE

## 2021-06-09 RX ORDER — OXYCODONE HYDROCHLORIDE 5 MG/1
5 TABLET ORAL EVERY 4 HOURS PRN
Status: DISCONTINUED | OUTPATIENT
Start: 2021-06-09 | End: 2021-06-10 | Stop reason: HOSPADM

## 2021-06-09 RX ORDER — OXYCODONE HYDROCHLORIDE 10 MG/1
10 TABLET ORAL EVERY 4 HOURS PRN
Status: DISCONTINUED | OUTPATIENT
Start: 2021-06-09 | End: 2021-06-10 | Stop reason: HOSPADM

## 2021-06-09 RX ORDER — ATORVASTATIN CALCIUM 20 MG/1
20 TABLET, FILM COATED ORAL NIGHTLY
COMMUNITY
End: 2021-12-06

## 2021-06-09 RX ADMIN — LISINOPRIL 10 MG: 10 TABLET ORAL at 12:15

## 2021-06-09 RX ADMIN — LEVOTHYROXINE SODIUM 137 MCG: 25 TABLET ORAL at 12:13

## 2021-06-09 RX ADMIN — SODIUM CHLORIDE, PRESERVATIVE FREE 10 ML: 5 INJECTION INTRAVENOUS at 19:37

## 2021-06-09 ASSESSMENT — PAIN SCALES - GENERAL
PAINLEVEL_OUTOF10: 0

## 2021-06-09 NOTE — FLOWSHEET NOTE
06/09/21 1156   Vital Signs   Orthostatic B/P and Pulse?  Yes   Blood Pressure Lying 128/83   Pulse Lying 84 PER MINUTE   Blood Pressure Sitting 125/92   Pulse Sitting 88 PER MINUTE   Blood Pressure Standing 124/93   Pulse Standing 92 PER MINUTE   Level of Consciousness Alert (0)

## 2021-06-09 NOTE — CONSULTS
[address] Carmen Wallis Said, 1955, [id number], 6/9/2021    History  [place]:  There were no encounter diagnoses. Patient  has a past medical history of Arthritis, CAD (coronary artery disease), H/O 24 hour EKG monitoring, H/O cardiac catheterization, H/O cardiovascular stress test, H/O chest x-ray, H/O Doppler ultrasound, H/O echocardiogram, Hashimoto disease, History of CT scan of brain, History of CT scan of chest, Hyperlipidemia, Hypothyroidism, Left shoulder pain, Obesity (BMI 30.0-34.9), Osteoarthritis of right knee, Prolonged emergence from general anesthesia, S/P CABG x 2, and TIA (transient ischemic attack). Patient  has a past surgical history that includes Appendectomy; Cholecystectomy; Coronary artery bypass graft (6/18/2012); and Inguinal hernia repair (Right, 07/14/2016). Subjective:  Patient states: \"My right side just feels weird. \"    Pain:  Denies pain.     Communication with other providers:  Handoff to RN  Restrictions: general precautions, fall risk    Home Setup/Prior level of function  Social/Functional History  Lives With: Spouse  Type of Home: House  Home Layout: Two level  Home Access: Stairs to enter with rails  Entrance Stairs - Number of Steps: 2  Entrance Stairs - Rails: Both  Bathroom Shower/Tub: Tub/Shower unit  Bathroom Toilet: Handicap height  Bathroom Equipment: Shower chair  Home Equipment: Rolling walker (Pt does not use walker)  ADL Assistance: [address]: Independent  Homemaking Responsibilities: Yes  Ambulation Assistance: Independent  Transfer Assistance: Independent  Active : Yes  Mode of Transportation: Car  Occupation: Retired  Additional Comments: Pt reports no falls in 6 months    Examination of body systems (includes body structures/functions, activity/participation limitations):  · Observation:  Pt sitting up in chair upon arrival and agreeable to therapy  · Vision:  [facility]  · home set up  Short term goal 3: Pt to ambulate 150' with no AD mod I  Short term goal 4: Pt to perform HEP independently to promote R LE strengthening and assist with sciatica       Treatment plan:  Bed mobility, transfers, balance, gait, TA, TX    Recommendations for NURSING mobility: amb with gait belt    Time:   Time in: 0954  Time out: 1021  Timed treatment minutes: 15  Total time: 27    Electronically signed by:    Jeronimo Lucero PT  6/9/2021, 10:27 AM

## 2021-06-09 NOTE — PROGRESS NOTES
Neurology Service Progress Note   [address] [facility]  Patient Name: Gypsy Hodgkins  : 1955        Subjective:   Patient seen and examined   We reviewed his MRI C spine   He denies CP and SOB  No repeat episodes  No tremor for me resting today   Wife at the bedside  We discussed plan outpatien       Past Medical History:   Diagnosis Date    Arthritis     CAD (coronary artery disease) 2012    s/p lima-lad svg-cx    H/O 24 hour EKG monitoring 2012    predominant sinus rhythm infrequent ventricular and supraventricular ectopy noted    H/O cardiac catheterization 2012    LM distal 70 LAD no disease CX no disease RCA dominant no disease LV ef 55    H/O cardiovascular stress test 19,, 13    Normal Study negative for ischemia. Normal LV size and systolic function. Ejection fraction is 56 %. Exercise tolerance is fair for age as patient exercised for 4 minutes on standard Stas protocol. Normal hemodynamic response to exercise    H/O chest x-ray 2012    stable opacification of the LLL consistent with a small left pleural effusion and atelectasis or pneumonia    H/O Doppler ultrasound 2012 carotid    mild plaquing in the common carotid arteries and carotid bulb no physiologically significant stenosis is identified by grayscale or duplex techniques in the extracranial carotid systems    H/O echocardiogram 13, 2012    Mild left ventr. hypertrophy EF 50-55%    Hashimoto disease     History of CT scan of brain 2012    no acute intracranial abnormality identified  no significant interval change compared to prior studyt    History of CT scan of chest 2012    multiple subcentimeter bilateral pulmonary nodules indeterminate for benign nodues     Hyperlipidemia     Hypothyroidism 2012    Left shoulder pain 2019    ? angina    Obesity (BMI 30.0-34. 9)     Osteoarthritis of right knee 2018    Prolonged emergence from general anesthesia     S/P CABG x 2  Lack of Transportation (Non-Medical):    Physical Activity:     Days of Exercise per Week:     Minutes of Exercise per Session:    Stress:     Feeling of Stress :    Social Connections:     Frequency of Communication with Friends and Family:     Frequency of Social Gatherings with Friends and Family:     Attends Congregation Services:     Active Member of Clubs or Organizations:     Attends Club or Organization Meetings:     Marital Status:    Intimate Partner Violence:     Fear of Current or Ex-Partner:     Emotionally Abused:     Physically Abused:     Sexually Abused:       Family History   Problem Relation Age of Onset    High Cholesterol Sister     Diabetes Sister     Stroke Brother     Heart Disease Mother     Cancer Father     Stroke Brother     Cancer Sister        Review of Symptoms:    14-point system review completed. All of which are negative except as mentioned above. Physical Exam:         Gen: A&O x 4, NAD, cooperative  HEENT: NC/AT, EOMI, PERRL, mmm, no carotid bruits, neck supple, no meningeal signs; Fundoscopic: no disc edema appreciated  Heart: RRR, S1S2  Lungs: CTAB  Ext: no edema, no calf tenderness b/l  Psych: normal mood and affect  Skin: no rashes or lesions    NEUROLOGIC EXAM:    Mental Status: A&O to self, location, month and year, NAD, speech clear, language fluent, repetition and naming intact, follows commands appropriately    Cranial Nerve Exam:   CN II-XII: No disc edema appreciated on fundoscopic examination, PERRL, VFF, no nystagmus, no gaze paresis, sensation V1-V3 intact b/l, muscles of facial expression symmetric; hearing intact to conversational tone, palate elevates symmetrically, shoulder elevation symmetric and tongue protrudes midline with movement side to side.     Motor Exam:       Strength 5/5 UE's/LLE's, RUE weakness  Tone and bulk normal   No pronator drift    Deep Tendon Reflexes: 2/4 biceps, triceps, brachioradialis, patellar, and achilles b/l; flexor plantar responses b/l    Sensation: Intact light touch/pinprick/vibration UE's/LE's b/l    Coordination/Cerebellum:       Tremors--intermittent RUE      Rapidly alternating movements: no dysdiadochokinesia b/l                Heel-to-Shin: no dysmetria b/l      Finger-to-Nose: no dysmetria b/l    Gait and stance:      Gait: deferred      LABS:     Recent Labs     06/06/21  1515 06/07/21  0530 06/08/21  0645   WBC 8.0 10.9* 8.5    136 139   K 3.5 4.0 4.2    105 106   CO2 25 22 23   BUN 11 11 12   CREATININE 1.1 0.9 0.9   GLUCOSE 93 103* 102*       IMAGING:      MRI C spine:  Mild congenital spinal canal stenosis superimposed over moderate diffuse   degenerative disc disease.       Severe central canal stenosis at C6-7 of multifactorial etiology.  In   addition moderate-sized focal right paracentral disc protrusion mildly   compresses the right portion of the cervical cord.  These changes have   progressed since the previous exam.       Moderate central canal stenosis C5-6 has mildly progressed.       Mild central canal stenosis at C3-4 and C4-5 not significantly changed.       Multilevel bilateral neural foraminal stenosis as noted above most severe at   C5-6 and C6-7.  These changes have progressed since the previous exam.     MRI Brain:  Unremarkable exam.  No acute ischemia. 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 Seizure etiology will be explored on an outpatient basis. Low suspicion for stroke. MRI-C spine concerning for radiculopathy or myelopathy, neurosurgery to assess the patient plan of care as follows:  1. Neuro exam:   1. RUE weaknesss  2. Neurodiagnostics:  1. MRI brain as above  2. MRI C-spine as above  3. Medications:  1. Aspirin 81 mg  2. Lipitor 40 mg  4. PT/OT/ST:  1. Per their recommendation  5.

## 2021-06-09 NOTE — PROGRESS NOTES
Patient's wife brought patient's home medications in the bottles with labels in for this nurse to see. She also brought his med minder for this nurse to check to verify that the pills in it were the same in the bottles. Wife already gave patient his AM medications when she arrived before this nurse had gotten to the room. Explained that we need to monitor patient medication administration and that we could have his bottles labeled from pharmacy for us to give him his own meds if that's what they wish. Patient may be discharged today so patient/wife notified that we would have his medications labeled and give them to him ourselves tonight if so. Verbalized understanding. Wife states patient cannot take generic brands of medications due to his Hasimotos disease so that's why they want his own given. Home medication list checked and updated based off bottles brought in. Dr Adis De La Torre notified.  CW

## 2021-06-09 NOTE — PROGRESS NOTES
Cardiology Progress Note     Today's Plan orthostatic bp     Admit Date:  6/7/2021    Consult reason/ Seen today for: syncope    Subjective and  Overnight Events:  Up in chair -denies any further syncope or near syncope -     Telemetry Sinus rhythm / sinus tachycardia     Assessment / Plan / Recommendation:   1. syncope - occurred after mowing grass and was given NTG- orthostatic hypotension will check orthostatic bp- will plan for out patient event monitor to assess for arrhythmia stress test normal perfusion -CTA head and neck no acute abnormality     2. CAD- denies chest pain-negative troponin - notes fatigue- NM stress test complete-normal perfusion and EF- echocardiogram EF 50% with moderate LVH-continue with asa/ statin     3. HTN-stable continue with lisinopril     4. Hyperlipidemia- continue statin             History of Presenting Illness:    Chief complain on admission : 78 y. o.year old who is admitted forNo chief complaint on file. Past medical history:    has a past medical history of Arthritis, CAD (coronary artery disease), H/O 24 hour EKG monitoring, H/O cardiac catheterization, H/O cardiovascular stress test, H/O chest x-ray, H/O Doppler ultrasound, H/O echocardiogram, Hashimoto disease, History of CT scan of brain, History of CT scan of chest, Hyperlipidemia, Hypothyroidism, Left shoulder pain, Obesity (BMI 30.0-34.9), Osteoarthritis of right knee, Prolonged emergence from general anesthesia, S/P CABG x 2, and TIA (transient ischemic attack). Past surgical history:   has a past surgical history that includes Appendectomy; Cholecystectomy; Coronary artery bypass graft (6/18/2012); and Inguinal hernia repair (Right, 07/14/2016). Social History:   reports that he quit smoking about 10 years ago. He has a 43.00 pack-year smoking history. He has never used smokeless tobacco. He reports current alcohol use.  He reports 06/08/21  0645   WBC 8.0 10.9* 8.5   HGB 12.0* 13.4* 13.6   HCT 35.5* 40.4* 41.0*   MCV 91.0 92.4 92.6    235 238     BMP:   Recent Labs     06/06/21  1515 06/07/21  0530 06/08/21  0645    136 139   K 3.5 4.0 4.2    105 106   CO2 25 22 23   BUN 11 11 12   CREATININE 1.1 0.9 0.9     PT/INR: No results for input(s): PROTIME, INR in the last 72 hours. BNP:    Recent Labs     06/06/21  1515   PROBNP 31.35     TROPONIN:   Recent Labs     06/06/21  1515 06/07/21  0530   TROPONINT <0.010 <0.010              Impression:  Active Problems:    Acute CVA (cerebrovascular accident) ([facility] [address]  Resolved Problems:    * No resolved hospital problems. *       All labs, medications and tests reviewed by myself, continue all other medications of all above medical condition listed as is except for changes mentioned above. Thank you   Please call with questions. Electronically signed by EDIL Tan CNP on 6/9/2021 at 11:35 AM  I have seen ,spoken to  and examined this patient personally, independently of the nurse practitioner. I have reviewed the hospital care given to date and reviewed all pertinent labs and imaging. The plan was developed mutually at the time of the visit with the patient,  NP  and myself. I have spoken with patient, nursing staff and provided written and verbal instructions . The above note has been reviewed and I agree with the assessment, diagnosis, and treatment plan with changes made by me as follows     CARDIOLOGY ATTENDING ADDENDUM    HPI:  I have reviewed the above HPI  And agree with above   Rae Gonzales is a 78 y. o.year old who and presents with had no chief complaint listed for this encounter. No chief complaint on file.     Interval history:  Had stress test yesterday    Physical Exam:  General:  Awake, alert, NAD  Head:normal  Eye:normal  Neck:  No JVD   Chest:  Clear to auscultation, respiration easy  Cardiovascular:  RRR S1S2  Abdomen:   nontender  Extremities:  tr edema Pulses; palpable  Neuro: grossly normal      MEDICAL DECISION MAKING;    I agree with the above plan, which was planned by myself and discussed with NP. Normal stress  ?  Orthostatic hypotension        Gregory Velazquez MD [address] [facility]

## 2021-06-09 NOTE — PROGRESS NOTES
Hospitalist Progress Note      Name:  Trever Murillo /Age/Sex: 1955  (78 y.o. male)   MRN & CSN:  [id number] & [id number] Admission Date/Time: 2021  6:25 PM   Location:  [address] [facility] Day: 3    Assessment and Plan:   Trever Murillo is a 78 y.o.  male with a past medical history of CAD s/p CABG who presents with recurrent right hemisensory symptoms including anesthesia, paresthesia, hyperesthesia, weakness as well as neck pain, intermittent chest pain of several weeks duration and syncope PTA        1. Chest pain: Likely noncardiac  No history of CABG, stress test was normal.    2.  Syncope: Clinically unable to determine whether this was vasovagal or convulsive  Negative orthostatics. PT/OT    3. Right hemisensory symptoms: Neck pain abnormal MRI of C-spine  Severe central canal stenosis at C6-7 of multifactorial etiology.  In   addition moderate-sized focal right paracentral disc protrusion mildly   compresses the right portion of the cervical cord.  These changes have   progressed since the previous exam.       Moderate central canal stenosis C5-6 has mildly progressed. Urgent neurosurgery consult. Avoid NSAIDs for neck pain. Oxycodone as needed    4. Hypertension: Continue lisinopril  5. Hypothyroidism: Continue Synthroid  6. Dyslipidemia: Continue Lipitor      Diet ADULT DIET;  Regular; Low Fat/Low Chol/High Fiber/SHAN; Low Sodium (2 gm)   DVT Prophylaxis [x] Lovenox, []  Heparin, [] SCDs, [] Ambulation   GI Prophylaxis [] PPI,  [] H2 Blocker,  [] Carafate,  [x] Diet/Tube Feeds   Code Status Full Code   Disposition Patient requires continued admission due to complete work-up   MDM [] Low, [] Moderate,[x]  High  Patient's risk as above due to multiple medical problems     History of Present Illness:     Chief Complaint: <principal problem not specified>  Trever Murillo is a 78 y.o.  male with a past medical history of CAD s/p CABG who presents with recurrent right hemisensory symptoms including anesthesia, paresthesia, hyperesthesia, weakness as well as neck pain, intermittent chest pain of several weeks duration and syncope PTA     Patient is seen and examined after MRI today. Ten point ROS reviewed negative, unless as noted above    Objective:   No intake or output data in the 24 hours ending 06/09/21 1356   Vitals:   Vitals:    06/09/21 0745   BP: 135/74   Pulse: 93   Resp: 18   Temp: 98.2 °F (36.8 °C)   SpO2:      Physical Exam:   GEN Awake male, sitting upright in bed in no apparent distress. Appears given age. EYES Pupils are equally round. No scleral erythema, discharge, or conjunctivitis. HENT Mucous membranes are moist. Oral pharynx without exudates, no evidence of thrush. NECK Supple, no apparent thyromegaly or masses. RESP Clear to auscultation, no wheezes, rales or rhonchi. Symmetric chest movement while on room air. CARDIO/VASC S1/S2 auscultated. Regular rate without appreciable murmurs, rubs, or gallops. No JVD or carotid bruits. Peripheral pulses equal bilaterally and palpable. No peripheral edema. GI Abdomen is soft without significant tenderness, masses, or guarding. Bowel sounds are normoactive. Rectal exam deferred.  No costovertebral angle tenderness. Normal appearing external genitalia. Younger catheter is not present. HEME/LYMPH No palpable cervical lymphadenopathy and no hepatosplenomegaly. No petechiae or ecchymoses. MSK No gross joint deformities. SKIN Normal coloration, warm, dry. NEURO Cranial nerves appear grossly intact, normal speech, right upper extremity  strength decreased compared to left  PSYCH Awake, alert, oriented x 4. Affect appropriate.     Medications:   Medications:    sodium chloride flush  5-40 mL Intravenous 2 times per day    enoxaparin  40 mg Subcutaneous Daily    aspirin  81 mg Oral Daily    Or    aspirin  300 mg Rectal Daily    atorvastatin  40 mg Oral Nightly    lisinopril 10 mg Oral Daily    levothyroxine  137 mcg Oral QAM AC      Infusions:    sodium chloride       PRN Meds: sodium chloride flush, 5-40 mL, PRN  sodium chloride, 25 mL, PRN  ondansetron, 4 mg, Q8H PRN   Or  ondansetron, 4 mg, Q6H PRN  polyethylene glycol, 17 g, Daily PRN  ibuprofen, 400 mg, BID PRN          Electronically signed by Pranav Chilel MD on 6/9/2021 at 1:56 PM

## 2021-06-09 NOTE — PROGRESS NOTES
Physician Progress Note      PATIENT:               Elidia Paz  CSN #:                  [id number]  :                       1955  ADMIT DATE:       2021 6:25 PM  [address] [place] DATE:  RESPONDING  PROVIDER #:        ANGELIKA Kent TEXT:    Hospitalists,    Patient admitted with stroke like symptoms r/o TIA/CVA. If possible,  please   document if the diagnosis of  CVA and TIA have been ruled out after further   study. The medical record reflects the following:  Risk Factors: PMH TIA, CAD  Clinical Indicators: No acute findings on imaging, normal ECHO & stress test,   weakness, paresthesia, syncope, Per nuero consult-\"low suspicion for Stroke\"  Treatment: labs, imaging, Neuro & Cardiology consult    Thank you,  Everetet Verma RN CDS  Options provided:  -- TIA confirmed, CVA ruled out  -- CVA confirmed, TIA ruled out  -- Both TIA and CVA ruled out  -- Other - I will add my own diagnosis  -- Disagree - Not applicable / Not valid  -- Disagree - Clinically unable to determine / Unknown  -- Refer to Clinical Documentation Reviewer    PROVIDER RESPONSE TEXT:    Both TIA and CVA ruled out    Query created by: Kaitlin Watkins on 2021 11:53 AM      QUERY TEXT:    Hospitalists,    Patient admitted with stroke like symptoms and has syncope due to vasovagal   documented by Internal Medicine and convulsive syncope documented by   Neurology. If possible, please document in progress notes and discharge   summary any of the following: The medical record reflects the following:  Risk Factors: hypotension, PMH TIA  Clinical Indicators: Per neuro documentation: \"[id number] male with acute   loss of conscious episode with shaking description of events consistent with   convulsive syncope superimposed on suspected dystonic tremor and spastic   torticollis. Seizure etiology will be explored on an outpatient basis. Low   suspicion for stroke.  The presenting episode certainly did not sound syncopal   in
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
## 2021-06-10 VITALS
WEIGHT: 226.63 LBS | BODY MASS INDEX: 34.35 KG/M2 | DIASTOLIC BLOOD PRESSURE: 85 MMHG | TEMPERATURE: 98.3 F | SYSTOLIC BLOOD PRESSURE: 118 MMHG | RESPIRATION RATE: 24 BRPM | OXYGEN SATURATION: 97 % | HEIGHT: 68 IN | HEART RATE: 89 BPM

## 2021-06-10 PROBLEM — M54.12 CERVICAL MYELOPATHY WITH CERVICAL RADICULOPATHY (HCC): Status: ACTIVE | Noted: 2021-06-10

## 2021-06-10 PROBLEM — G95.9 CERVICAL MYELOPATHY WITH CERVICAL RADICULOPATHY (HCC): Status: ACTIVE | Noted: 2021-06-10

## 2021-06-10 PROCEDURE — 97530 THERAPEUTIC ACTIVITIES: CPT

## 2021-06-10 PROCEDURE — 2580000003 HC RX 258: Performed by: INTERNAL MEDICINE

## 2021-06-10 PROCEDURE — APPSS45 APP SPLIT SHARED TIME 31-45 MINUTES: Performed by: NURSE PRACTITIONER

## 2021-06-10 PROCEDURE — 94761 N-INVAS EAR/PLS OXIMETRY MLT: CPT

## 2021-06-10 PROCEDURE — 99232 SBSQ HOSP IP/OBS MODERATE 35: CPT | Performed by: INTERNAL MEDICINE

## 2021-06-10 PROCEDURE — 6370000000 HC RX 637 (ALT 250 FOR IP): Performed by: INTERNAL MEDICINE

## 2021-06-10 RX ORDER — PANTOPRAZOLE SODIUM 40 MG/1
40 TABLET, DELAYED RELEASE ORAL
Qty: 30 TABLET | Refills: 3 | Status: ON HOLD | OUTPATIENT
Start: 2021-06-11 | End: 2021-09-16

## 2021-06-10 RX ORDER — PANTOPRAZOLE SODIUM 40 MG/1
40 TABLET, DELAYED RELEASE ORAL
Status: DISCONTINUED | OUTPATIENT
Start: 2021-06-11 | End: 2021-06-10 | Stop reason: HOSPADM

## 2021-06-10 RX ORDER — OXYCODONE HYDROCHLORIDE 5 MG/1
5 TABLET ORAL EVERY 4 HOURS PRN
Qty: 12 TABLET | Refills: 0 | Status: SHIPPED | OUTPATIENT
Start: 2021-06-10 | End: 2021-06-13

## 2021-06-10 RX ADMIN — LEVOTHYROXINE SODIUM 137 MCG: 25 TABLET ORAL at 07:00

## 2021-06-10 RX ADMIN — SODIUM CHLORIDE, PRESERVATIVE FREE 10 ML: 5 INJECTION INTRAVENOUS at 08:53

## 2021-06-10 RX ADMIN — LISINOPRIL 10 MG: 10 TABLET ORAL at 08:53

## 2021-06-10 ASSESSMENT — PAIN SCALES - GENERAL
PAINLEVEL_OUTOF10: 0

## 2021-06-10 NOTE — PROGRESS NOTES
Hospitalist Progress Note      Name:  Michael Atwood /Age/Sex: 1955  (78 y.o. male)   MRN & CSN:  [id number] & [id number] Admission Date/Time: 2021  6:25 PM   Location:  [facility] PCP: Jordy العراقي [address] [place] [address] Day: 4    Assessment and Plan:   Michael Atwood is a 78 y.o.  male with a past medical history of CAD s/p CABG who presents with recurrent right hemisensory symptoms including anesthesia, paresthesia, hyperesthesia, weakness as well as neck pain, intermittent chest pain of several weeks duration and syncope PTA        1. Cervical myelopathy: As seen on MRI. Canal stenosis on C5-C6, progressed from prior, severe canal stenosis at C6 and C7 with moderate sized right paracentral disc protrusion  Noncardiac chest pain: No further episodes. Awaiting neurosurgical recommendations for possible decompression  Avoid NSAIDs for pain. Oxycodone as needed for. 2.  Syncope: Clinically unable to determine whether this was vasovagal or convulsive  Negative orthostatics. PT/OT    3. Hypertension: Continue lisinopril  4. Hypothyroidism: Continue Synthroid  5. Dyslipidemia: Continue Lipitor      Diet Diet NPO   DVT Prophylaxis [] Lovenox, []  Heparin, [] SCDs, [] Ambulation -on hold for possible surgery   GI Prophylaxis [x] PPI,  [] H2 Blocker,  [] Carafate,  [x] Diet/Tube Feeds   Code Status Full Code   Disposition Patient requires continued admission due to spine surgery evaluation   MDM [] Low, [x] Moderate,[]  High   Patient's risk as above due to comorbidities     History of Present Illness:     Chief Complaint: Michael Atwood is a 78 y.o.  male with a past medical history of CAD s/p CABG who presents with recurrent right hemisensory symptoms including anesthesia, paresthesia, hyperesthesia, weakness as well as neck pain, intermittent chest pain of several weeks duration and syncope PTA     1621: Patient is seen and examined, has no complaints.   He is currently n.p.o.

## 2021-06-10 NOTE — CONSULTS
HPI:  78 y.o. male who was admitted for altered mentation and right-sided weakness that started acutely when mowing the lawn. His wife reports that he has had several similar episodes in the past.  He suffers from chronic neck pain that radiates to his right scapula and right hand. Physical exam:  Vitals:    06/10/21 0730 06/10/21 0845 06/10/21 1036 06/10/21 1145   BP:  118/85  118/85   Pulse: 73 74  89   Resp:  16  24   Temp:  98.3 °F (36.8 °C)     TempSrc:  Oral     SpO2:  98% 97%    Weight:       Height:         Awake, alert. No acute distress. Motor 5/5 bilateral upper and lower extremities. Sensation intact to light touch. Deep tendon reflexes normal and symmetric. Lab Results   Component Value Date    WBC 8.5 06/08/2021    HGB 13.6 06/08/2021    HCT 41.0 (L) 06/08/2021    MCV 92.6 06/08/2021     06/08/2021     Lab Results   Component Value Date     06/08/2021    K 4.2 06/08/2021     06/08/2021    CO2 23 06/08/2021    BUN 12 06/08/2021    CREATININE 0.9 06/08/2021    GLUCOSE 102 06/08/2021    CALCIUM 9.1 06/08/2021      Lab Results   Component Value Date    APTT 26.2 06/18/2012     Lab Results   Component Value Date    INR 0.9 12/26/2017    PROTIME 9.7 12/26/2017       Imaging:  I have personally reviewed the images as well as reports from brain and cervical MRI done on 6/9/2021. Brain MRI was normal.  There is no evidence of stroke. Cervical MRI shows multilevel cervical spondylosis and central stenosis at C5-6 and C6-7. Disc osteophyte complexes are seen bilaterally at C5-6 resulting in foraminal stenosis. Right-sided disc osteophyte complexes seen at C6-7 with foraminal stenosis. Impression:  Cervical spondylosis with myelopathy. Recommendations:   The patient may be a candidate for C5-6 and C6-7 anterior cervical discectomy and fusion.  Such surgery is elective and can be done at a later time. [address] cardiology work-up. [address] neurology consult.  No further neurosurgical intervention at this time.  Patient to follow-up with me in the office after discharge.  Please call with any questions or concerns. Sahara Anglin MD, [address], [facility]  Board Certified Neurosurgeon  Minimally Invasive Spine Surgeon  President of [address] [place] [address]..    Phone: Hum[employer]uan f Samm ([phone])  Fax: [phone]  Website: [employer]

## 2021-06-10 NOTE — PROGRESS NOTES
Occupational Therapy  . Occupational Therapy Treatment Note      Name: Amber Nelson MRN: [id number] :   1955   Date:  6/10/2021   Admission Date: 2021 Room:  [facility]     Primary Problem:     Restrictions/Precautions:  Restrictions/Precautions  Restrictions/Precautions: Fall Risk, General Precautions  Required Braces or Orthoses?: No     Communication with other providers:  Per chart review and Charge Nurse Dori Vega, patient is appropriate for therapeutic intervention. Per whiteboard note in room, pt is a possible candidate for surgery from Dr. Hi Wilson pending consult. Subjective:  Patient states: Shayy Mathur are now wondering if my (right) arm problems are related to a neurological problem and not any stroke. Dr. Hi Wilson is going to let me know. \" Pt declined any ADL needs, requesting to \"get up and walk\". Pain: 0/10, denies pain (location, type, intensity)    Objective:    Observation:  Pt received in semi-fowlers, alert and oriented, spouse present at bedside. Objective Measures:  Telemetry    Treatment, including education:  Therapeutic Activity Training:   Therapeutic activity training was instructed today. Cues were given for safety, sequence, UE/LE placement, awareness, and balance. Activities performed today included bed mobility training, sup-sit, sit-stand, SPT. Supine<>sit: Sup  Scooting: Sup  Sit<>stands: CGA w/o AD  SPT: CGA w/o AD  Functional Mobility: CGA w/o AD >250 ft in hallway    All therapeutic intervention performed c emphasis on dynamic balance / standing tolerance to inc strength, endurance and act tolerance for inc Indep c ADL tasks, func transfers / mobility. Safety  Patient safely in bed + alarm set at end of session, with call light/phone in reach, and nursing aware. Gait belt was used for func transfers / mobility.     Assessment / Impression:    Patient's tolerance of treatment: Well  Adverse Reaction: None  Significant change in status and impact:  None Barriers to improvement: Balance      Plan for Next Session:    Continue per OT POC unless pt warrants a re-evaluation if receives surgical intervention. Time in:  1135  Time out:  1152  Timed treatment minutes:  17  Total treatment time:  17      Electronically signed by:    HARMAN Reed  6/10/2021, 12:06 PM    Goals  Short term goals  Time Frame for Short term goals: discharge  Short term goal 1: Pt will complete UB/LB dressing independently. Short term goal 2: Pt will complete UB/LB bathing independently. Short term goal 3: Pt will complete all aspects of toileting independently. Short term goal 4: Pt will complete oral hygiene/grooming in stand at sink independently. Short term goal 5: Pt will perform ther ex/ther act to inc activity tolerance to perform functional activity in stand ~ 8mins.

## 2021-06-10 NOTE — DISCHARGE SUMMARY
Discharge Summary    Name:  Rosy Wayne /Age/Sex: 1955  (78 y.o. male)   MRN & CSN:  [id number] & [id number] Admission Date/Time: 2021  6:25 PM   Attending:  Umer Argueta MD Discharging Physician: Umer Argueta MD     Hospital Course:   Rosy Wayne is a 78 y.o.  male with a past medical history of CAD s/p CABG, hypertension, obesity, hypothyroidism who presents with syncopal episode, right hemianesthesia, right hyperesthesia, neck pain. Patient had MRI of the brain which ruled out acute CVA. MRI of the C-spine showed moderate canal stenosis on the C5-C6 progressed from prior, severe canal stenosis and C6-C7 with moderate sized right paracentral disc protrusion. Due to walking diagnosis of cervical myelopathy with right-sided radiculopathy, spine surgery evaluation was requested. Patient will follow up closely in the office for surgical planning as he will be a candidate for ACDF. At this time, patient is cleared for discharge. Etiology of his syncope is undetermined at this time but differential remains vasovagal or convulsive syncope precipitated by dehydration. Preoperative clearance: Patient is medically cleared for planned surgical procedure with an RCRI of 2 placing his risk of 10% for major adverse cardiac event or death in the next 30 days. .  The patient/family expressed appropriate understanding of and agreement with the discharge recommendations, medications, and plan.      Consults this admission:  IP CONSULT TO NEUROLOGY  IP CONSULT TO CARDIOLOGY  IP CONSULT TO NEUROSURGERY    Discharge Instruction:   Follow up appointments:Neurology, spine surgery  Primary care physician:  within 2 weeks    Diet:  cardiac diet   Activity: activity as tolerated  Disposition: Discharged to:   [x]Home, []HHC, []SNF, []Acute Rehab, []Hospice   Condition on discharge: Stable    Discharge Medications:      Rashmi Perks, [address] Medication Instructions SOJ:[id number]    Printed on:06/10/21 1401   Medication Information                      aspirin EC 81 MG EC tablet  Take 1 tablet by mouth daily. atorvastatin (LIPITOR) 20 MG tablet  Take 20 mg by mouth nightly             lisinopril (PRINIVIL;ZESTRIL) 10 MG tablet  Take 10 mg by mouth daily             nitroGLYCERIN (NITROSTAT) 0.4 MG SL tablet  Place 1 tablet under the tongue every 5 minutes as needed for Chest pain             oxyCODONE (ROXICODONE) 5 MG immediate release tablet  Take 1 tablet by mouth every 4 hours as needed for Pain for up to 3 days. pantoprazole (PROTONIX) 40 MG tablet  Take 1 tablet by mouth every morning (before breakfast)             SYNTHROID 137 MCG tablet  TAKE 1 TABLET BY MOUTH EVERY DAY                 Objective Findings at Discharge:   /85   Pulse 89   Temp 98.3 °F (36.8 °C) (Oral)   Resp 24   Ht 5' 8\" (1.727 m)   Wt 226 lb 10.1 oz (102.8 kg)   SpO2 97%   BMI 34.46 kg/m²            PHYSICAL EXAM  GEN: Awake male, alert and oriented x3 in no apparent distress. Appears given age. HEENT: Normal   NECK: Supple, no apparent thyromegaly or masses. No ZAIN  RESP: Clear lung fields bilaterally. Symmetric chest movement while on room air. CVS: RRR, S1, S2  GI/: Abdomen is soft, nontender, no organomegaly. . Bowel sounds normal, rectal exam deferred. No CVA tenderness. MSK: No gross joint deformities. No tenderness  SKIN: Normal coloration, warm, dry. NEURO: Cranial nerves appear grossly intact, normal speech, decreased right hand  strength otherwise no lateralizing weakness. PSYCH: Awake, alert, oriented x 4. Affect appropriate.     BMP/CBC  Recent Labs     06/08/21  0645      K 4.2      CO2 23   BUN 12   CREATININE 0.9   WBC 8.5   HCT 41.0*          Discharge Time of 35 minutes    Electronically signed by Hung Ware MD on 6/10/2021 at 2:01 PM

## 2021-06-10 NOTE — CARE COORDINATION
Spoke with pt for discharge planning. He confirmed plan to return home with his wife/Tomás at discharge. Discussed OT rec of [facility] and pt politely declined as he does not feel it will help him at discharge. Pt explained he is awaiting eval with Dr. Armida Busby. CM available if needs arise.

## 2021-06-10 NOTE — PROGRESS NOTES
reports current alcohol use. He reports that he does not use drugs. Family history:  family history includes Cancer in his father and sister; Diabetes in his sister; Heart Disease in his mother; High Cholesterol in his sister; Stroke in his brother and brother. Allergies   Allergen Reactions    Clopidogrel Anaphylaxis     petechiae    Simvastatin Other (See Comments)     myalagia       Review of Systems:   All 14 systems were reviewed and are negative  Except for the positive findings which are documented     /85   Pulse 74   Temp 98.3 °F (36.8 °C) (Oral)   Resp 16   Ht 5' 8\" (1.727 m)   Wt 226 lb 10.1 oz (102.8 kg)   SpO2 97%   BMI 34.46 kg/m²   No intake or output data in the 24 hours ending 06/10/21 1051    Physical Exam:  Physical Exam  Vitals reviewed. Constitutional:       Appearance: He is obese. HENT:      Head: Normocephalic and atraumatic. Mouth/Throat:      Mouth: Mucous membranes are moist.   Cardiovascular:      Rate and Rhythm: Normal rate and regular rhythm. Pulses: Normal pulses. Heart sounds: Normal heart sounds. Abdominal:      Palpations: Abdomen is soft. Musculoskeletal:      Cervical back: Normal range of motion. Skin:     General: Skin is warm and dry. Capillary Refill: Capillary refill takes less than 2 seconds. Neurological:      General: No focal deficit present. Mental Status: He is alert.    Psychiatric:         Mood and Affect: Mood normal.         Behavior: Behavior normal.          Medications:    sodium chloride flush  5-40 mL Intravenous 2 times per day    enoxaparin  40 mg Subcutaneous Daily    aspirin  81 mg Oral Daily    Or    aspirin  300 mg Rectal Daily    atorvastatin  40 mg Oral Nightly    lisinopril  10 mg Oral Daily    levothyroxine  137 mcg Oral QAM AC      sodium chloride       oxyCODONE **OR** oxyCODONE, sodium chloride flush, sodium chloride, ondansetron **OR** ondansetron, polyethylene glycol    Lab Data: CBC:   Recent Labs     06/08/21  0645   WBC 8.5   HGB 13.6   HCT 41.0*   MCV 92.6        BMP:   Recent Labs     06/08/21  0645      K 4.2      CO2 23   BUN 12   CREATININE 0.9     PT/INR: No results for input(s): PROTIME, INR in the last 72 hours. BNP:    No results for input(s): PROBNP in the last 72 hours. TROPONIN:   No results for input(s): TROPONINT in the last 72 hours. Impression:  Active Problems:    Acute CVA (cerebrovascular accident) [facility])  Resolved Problems:    * No resolved hospital problems. *       All labs, medications and tests reviewed by myself, continue all other medications of all above medical condition listed as is except for changes mentioned above. Thank you   Please call with questions. Electronically signed by EDIL Ac CNP on 6/10/2021 at 10:51 AM  I have seen ,spoken to  and examined this patient personally, independently of the nurse practitioner. I have reviewed the hospital care given to date and reviewed all pertinent labs and imaging. The plan was developed mutually at the time of the visit with the patient,  NP  and myself. I have spoken with patient, nursing staff and provided written and verbal instructions . The above note has been reviewed and I agree with the assessment, diagnosis, and treatment plan with changes made by me as follows     CARDIOLOGY ATTENDING ADDENDUM    HPI:  I have reviewed the above HPI  And agree with above   Magnus Londono is a 78 y. o.year old who and presents with had no chief complaint listed for this encounter. No chief complaint on file.     Interval history:  HAD NORMAL STRESS AND ECHO    Physical Exam:  General:  Awake, alert, NAD  Head:normal  Eye:normal  Neck:  No JVD   Chest:  Clear to auscultation, respiration easy  Cardiovascular:  RRR S1S2  Abdomen:   nontender  Extremities:  NO edema  Pulses; palpable  Neuro: grossly normal      MEDICAL DECISION MAKING;    I agree with the above plan, which was planned by myself and discussed with NP.   FU AS op FOR MONITOR  AMBULATE          Tootie Obrien MD [facility]

## 2021-06-10 NOTE — PROGRESS NOTES
Hospitalist Progress Note      Name:  Rahel Steiner /Age/Sex: 1955  (78 y.o. male)   MRN & CSN:  [id number] & [id number] Admission Date/Time: 2021  6:25 PM   Location:  [facility] PCP: Lisa Macias [address] [place] [address] Day: 4    Assessment and Plan:   Rahel Steiner is a 78 y.o.  male with a past medical history of CAD s/p CABG who presents with recurrent right hemisensory symptoms including anesthesia, paresthesia, hyperesthesia, weakness as well as neck pain, intermittent chest pain of several weeks duration and syncope PTA        1. Cervical myelopathy: As seen on MRI. Canal stenosis on C5-C6, progressed from prior, severe canal stenosis at C6 and C7 with moderate sized right paracentral disc protrusion  Noncardiac chest pain: No further episodes. Awaiting neurosurgical recommendations for possible decompression  Avoid NSAIDs for pain. Oxycodone as needed for. 2.  Syncope: Clinically unable to determine whether this was vasovagal or convulsive  Negative orthostatics. PT/OT    3. Hypertension: Continue lisinopril  4. Hypothyroidism: Continue Synthroid  5. Dyslipidemia: Continue Lipitor      Diet Diet NPO   DVT Prophylaxis [] Lovenox, []  Heparin, [x] SCDs, [] Ambulation   GI Prophylaxis [] PPI,  [] H2 Blocker,  [] Carafate,  [x] Diet/Tube Feeds   Code Status Full Code   Disposition Patient requires continued admission due to complete work-up   MDM [] Low, [] Moderate,[x]  High  Patient's risk as above due to multiple medical problems     History of Present Illness:     Chief Complaint: <principal problem not specified>  Rahel Steiner is a 78 y.o.  male with a past medical history of CAD s/p CABG who presents with recurrent right hemisensory symptoms including anesthesia, paresthesia, hyperesthesia, weakness as well as neck pain, intermittent chest pain of several weeks duration and syncope PTA     1621: Patient is seen and examined, has no complaints.   He is currently n.p.o. awaiting evaluation by spine surgeon. Ten point ROS reviewed negative, unless as noted above    Objective:   No intake or output data in the 24 hours ending 06/10/21 1148   Vitals:   Vitals:    06/10/21 1036   BP:    Pulse:    Resp:    Temp:    SpO2: 97%     Physical Exam:   GEN Awake male, sitting upright in bed in no apparent distress. Appears given age. EYES Pupils are equally round. No scleral erythema, discharge, or conjunctivitis. HENT Mucous membranes are moist. Oral pharynx without exudates, no evidence of thrush. NECK Supple, no apparent thyromegaly or masses. RESP Clear to auscultation, no wheezes, rales or rhonchi. Symmetric chest movement while on room air. CARDIO/VASC S1/S2 auscultated. Regular rate without appreciable murmurs, rubs, or gallops. No JVD or carotid bruits. Peripheral pulses equal bilaterally and palpable. No peripheral edema. GI Abdomen is soft without significant tenderness, masses, or guarding. Bowel sounds are normoactive. Rectal exam deferred.  No costovertebral angle tenderness. Normal appearing external genitalia. Younger catheter is not present. HEME/LYMPH No palpable cervical lymphadenopathy and no hepatosplenomegaly. No petechiae or ecchymoses. MSK No gross joint deformities. SKIN Normal coloration, warm, dry. NEURO Cranial nerves appear grossly intact, normal speech, right upper extremity  strength decreased compared to left  PSYCH Awake, alert, oriented x 4. Affect appropriate.     Medications:   Medications:    sodium chloride flush  5-40 mL Intravenous 2 times per day    enoxaparin  40 mg Subcutaneous Daily    aspirin  81 mg Oral Daily    Or    aspirin  300 mg Rectal Daily    atorvastatin  40 mg Oral Nightly    lisinopril  10 mg Oral Daily    levothyroxine  137 mcg Oral QAM AC      Infusions:    sodium chloride       PRN Meds: oxyCODONE, 5 mg, Q4H PRN   Or  oxyCODONE, 10 mg, Q4H PRN  sodium chloride flush, 5-40 mL, PRN  sodium chloride, 25 mL, PRN  ondansetron, 4 mg, Q8H PRN   Or  ondansetron, 4 mg, Q6H PRN  polyethylene glycol, 17 g, Daily PRN          Electronically signed by Lyndon Bermeo MD on 6/10/2021 at 11:48 AM

## 2021-06-10 NOTE — CARE COORDINATION
Per patient he has had both covid vaccines. Patient to have wife bring in card for verification. If patient unable to provide vaccination card, will do a covid test if surgery is pursued.

## 2021-06-11 ENCOUNTER — NURSE ONLY (OUTPATIENT)
Dept: CARDIOLOGY CLINIC | Age: 66
End: 2021-06-11
Payer: MEDICARE

## 2021-06-11 ENCOUNTER — TELEPHONE (OUTPATIENT)
Dept: CARDIOLOGY CLINIC | Age: 66
End: 2021-06-11

## 2021-06-11 DIAGNOSIS — R55 SYNCOPE, UNSPECIFIED SYNCOPE TYPE: Primary | ICD-10-CM

## 2021-06-11 NOTE — TELEPHONE ENCOUNTER
Spoke with patient and explained will be receiving 30 day event monitor in the mail directly from [employer]. Advised patient to follow instructions that will be provided with monitor to activate. Patient voiced understanding. Also advised patient to call the office with any further questions or concerns. Patient again voiced understanding.

## 2021-06-11 NOTE — TELEPHONE ENCOUNTER
----- Message from Chu Lyons MA sent at 6/10/2021  4:42 PM EDT -----  This is an [facility] [place] patient can you please call patient and schedule.  Thank you!   ----- Message -----  From: EDIL Seaman CNP  Sent: 6/10/2021  10:55 AM EDT  To: Chu Lyons MA    Will need 30 day event monitor on discharge

## 2021-06-15 ENCOUNTER — TELEPHONE (OUTPATIENT)
Dept: CARDIOLOGY CLINIC | Age: 66
End: 2021-06-15

## 2021-06-15 NOTE — TELEPHONE ENCOUNTER
Will discuss monitor with Dr Manas Montoya at [facility] on 6/29/21

## 2021-06-15 NOTE — TELEPHONE ENCOUNTER
----- Message from EDIL Marshall CNP sent at 6/10/2021 10:55 AM EDT -----  Will need 30 day event monitor on discharge

## 2021-06-25 PROCEDURE — 93228 REMOTE 30 DAY ECG REV/REPORT: CPT | Performed by: INTERNAL MEDICINE

## 2021-07-29 ENCOUNTER — OFFICE VISIT (OUTPATIENT)
Dept: CARDIOLOGY CLINIC | Age: 66
End: 2021-07-29
Payer: MEDICARE

## 2021-07-29 VITALS
WEIGHT: 230 LBS | BODY MASS INDEX: 34.86 KG/M2 | HEART RATE: 82 BPM | SYSTOLIC BLOOD PRESSURE: 118 MMHG | DIASTOLIC BLOOD PRESSURE: 78 MMHG | HEIGHT: 68 IN

## 2021-07-29 DIAGNOSIS — Z01.818 PREOPERATIVE CLEARANCE: Primary | ICD-10-CM

## 2021-07-29 DIAGNOSIS — G47.33 OSA ON CPAP: ICD-10-CM

## 2021-07-29 DIAGNOSIS — Z95.1 S/P CABG X 2: ICD-10-CM

## 2021-07-29 DIAGNOSIS — R55 SYNCOPE AND COLLAPSE: ICD-10-CM

## 2021-07-29 DIAGNOSIS — E78.2 MIXED HYPERLIPIDEMIA: ICD-10-CM

## 2021-07-29 DIAGNOSIS — Z99.89 OSA ON CPAP: ICD-10-CM

## 2021-07-29 PROCEDURE — G8417 CALC BMI ABV UP PARAM F/U: HCPCS | Performed by: INTERNAL MEDICINE

## 2021-07-29 PROCEDURE — G8427 DOCREV CUR MEDS BY ELIG CLIN: HCPCS | Performed by: INTERNAL MEDICINE

## 2021-07-29 PROCEDURE — 4040F PNEUMOC VAC/ADMIN/RCVD: CPT | Performed by: INTERNAL MEDICINE

## 2021-07-29 PROCEDURE — 1123F ACP DISCUSS/DSCN MKR DOCD: CPT | Performed by: INTERNAL MEDICINE

## 2021-07-29 PROCEDURE — 3017F COLORECTAL CA SCREEN DOC REV: CPT | Performed by: INTERNAL MEDICINE

## 2021-07-29 PROCEDURE — 99215 OFFICE O/P EST HI 40 MIN: CPT | Performed by: INTERNAL MEDICINE

## 2021-07-29 PROCEDURE — 1036F TOBACCO NON-USER: CPT | Performed by: INTERNAL MEDICINE

## 2021-07-29 NOTE — LETTER
[address] [place]  1955  [id number]      Patient here for 6 month. He stated he still has dizziness he said he has a pinched nerve in his neck and hes unsure if that is the cause. Pt said generally when he stands up he gets a \"head rush\" which does not happen all the time not long lasting he will have to stand to stand still a little while and it will go away. If we do not have these labs you are retrieve these labs for these providers! Do you have a surgery or procedure scheduled in the near future - Yes   If Yes - Who is the surgery with?  Dr. Martha Lubin   Phone number of physician [phone]  Fax number of physician   Type of surgery : disc in neck in neck replaced with metal     GIVE THIS INFORMATION TO MARIALUISA BOWIE     Ask patient if they want to sign up for [employer] if they are not already signed up    [facility] to see if we have an E-MAIL on file for the patient     Check medication list thoroughly!!! AND RECONCILE OUTSIDE MEDICATIONS  If dose has changed change the entire order not just the MG  BE SURE TO ASK PATIENT IF [address] At check out add to every patient's \"wrap up\" the following dot phrase AFTERHOURSEDUCATION and ensure we explain this to our patients

## 2021-07-29 NOTE — PROGRESS NOTES
Jean Marie Marie (:  1955) is a 78 y.o. male,     Chief Complaint   Patient presents with    6 Month Follow-Up     Patient here for 6 month. He stated he still has dizziness he said he has a pinched nerve in his neck and hes unsure if that is the cause. Pt said generally when he stands up he gets a \"head rush\" which does not happen all the time not long lasting he will have to stand to stand still a little while and it will go away. Patient is here for follow up for known coronary artery disease, hyperlipidemia and has obstructive sleep apnea. He was hospitalized on 2021 with syncope. Records are reviewed in detail and has extensive evaluation including noninvasive cardiac work-up as well as neurological work-up and was also virtually seen by neurosurgery for severe spinal arthritis. Extensive record review suggest patient passed out due to heat exhaustion after doing yard work for 4 hours and was not feeling good and was administered nitroglycerin by wife followed by fainting. He has had similar episodes in the past as far back in  when he had bypass surgery for critical LAD stenosis. Patient is now mainly concerned about dizziness which is chronic and the right upper extremity tingling and numbness for which he is scheduled to have her EEG done on  and he is following up with neurosurgery Dr. Marlo Mendoza because MRI of his cervical spine showed severe central canal stenosis at see 6 and 7 for multifactorial etiology. This has progressed from previous examinations. In addition it reports mild 3 level bilateral neural foraminal stenosis which is also progressive. He denies any chest pains or increasing shortness of breath. Noninvasive cardiac work-up during recent admission is all reviewed and was negative for any significant ischemia or cardiac arrhythmias are valvular heart disease. His left ventricle function is well-preserved.   Medications are reviewed and he is very compliant to his medications. He is fully vaccinated against COVID-19. He does not smoke. He is asking for surgical clearance for cervical spinal surgery. Allergies   Allergen Reactions    Clopidogrel Anaphylaxis     petechiae    Simvastatin Other (See Comments)     myalagia     Prior to Admission medications    Medication Sig Start Date End Date Taking? Authorizing Provider   atorvastatin (LIPITOR) 20 MG tablet Take 20 mg by mouth nightly   Yes Historical Provider, MD   nitroGLYCERIN (NITROSTAT) 0.4 MG SL tablet Place 1 tablet under the tongue every 5 minutes as needed for Chest pain 12/29/20  Yes Iris Moon MD   SYNTHROID 137 MCG tablet TAKE 1 TABLET BY MOUTH EVERY DAY 5/16/19  Yes Historical Provider, MD   lisinopril (PRINIVIL;ZESTRIL) 10 MG tablet Take 10 mg by mouth daily   Yes Historical Provider, MD   aspirin EC 81 MG EC tablet Take 1 tablet by mouth daily. Patient taking differently: Take 81 mg by mouth nightly  6/23/12  Yes Parviz Meraz MD   pantoprazole (PROTONIX) 40 MG tablet Take 1 tablet by mouth every morning (before breakfast)  Patient not taking: Reported on 7/29/2021 6/11/21   Vito Bowman MD     Past Medical History:   Diagnosis Date    Arthritis     CAD (coronary artery disease) 6/11/2012    s/p lima-lad svg-cx    H/O 24 hour EKG monitoring 6/11/2012    predominant sinus rhythm infrequent ventricular and supraventricular ectopy noted    H/O cardiac catheterization 6/16/2012    LM distal 70 LAD no disease CX no disease RCA dominant no disease LV ef 54    H/O cardiovascular stress test 7/2/19,6/12, 12/12/13    Normal Study negative for ischemia. Normal LV size and systolic function. Ejection fraction is 56 %. Exercise tolerance is fair for age as patient exercised for 4 minutes on standard [facility] protocol.  Normal hemodynamic response to exercise    H/O chest x-ray 6/22/2012    stable opacification of the LLL consistent with a small left pleural effusion and atelectasis or pneumonia    H/O Doppler ultrasound 6/16/2012 carotid    mild plaquing in the common carotid arteries and carotid bulb no physiologically significant stenosis is identified by grayscale or duplex techniques in the extracranial carotid systems    H/O echocardiogram 12/31/13, 8/7/2012    Mild left ventr. hypertrophy EF 50-55%    Hashimoto disease     History of CT scan of brain 6/11/2012    no acute intracranial abnormality identified  no significant interval change compared to prior studyt    History of CT scan of chest 6/11/2012    multiple subcentimeter bilateral pulmonary nodules indeterminate for benign nodues     Hyperlipidemia     Hypothyroidism 6/11/2012    Left shoulder pain 6/25/2019    ? angina    Obesity (BMI 30.0-34. 9)     Osteoarthritis of right knee 2/26/2018    Prolonged emergence from general anesthesia     S/P CABG x 2 6/2012    s/p CABG x 2 lima-lad svg-cx 6/2012    TIA (transient ischemic attack) 3/2012    seen by Dr Still Gey:    07/29/21 0927   BP: 118/78   Pulse: 82   Weight: 230 lb (104.3 kg)   Height: 5' 8\" (1.727 m)      Body mass index is 34.97 kg/m². Wt Readings from Last 3 Encounters:   07/29/21 230 lb (104.3 kg)   06/08/21 226 lb 10.1 oz (102.8 kg)   06/06/21 235 lb (106.6 kg)     Constitutional:  Patient is moderately obese pleasant male in no apparent distress. HEENT: Is wearing glasses and facemask. Cardiovascular: Auscultation: Normal S1 and S2. No significant murmurs or gallops noted. Carotids are negative for any audible bruits. Abdominal aorta is nonpalpable. No epigastric bruit noted. Peripheral pulses: Pedal pulses are 1+ equal in both feet. Respiratory:  Respiratory effort is normal. Breath sounds are clear to auscultation. Extremities:  No edema, clubbing,  Cyanosis, petechiae. Abdomen:  No masses or tenderness. No organomegaly noted. Neurologic:  Oriented to time, place, and person and non-anxious.  No focal neurological deficit noted.  Psychiatric: Normal mood and effect. Pertinent records reviewed and discussed with patient and results are as follow:    Echocardiogram on June 8, 2021 reported  Left ventricular systolic function is lnormal.   Ejection fraction is visually estimated at 50%. Moderate left ventricular hypertrophy. Moderately dilated right ventricle. No significant valvular disease noted. No evidence of any pericardial effusion. Bubble study was negative. Nuclear stress test on June 8, 2021 reported  Normal tracer uptake in all segments of myocardium on stress ans rest  images. Normal Stress nuclear scintigraphic study suggestive of normal  myocardial perfusion. Gated images demonstrate normal left ventricular  systolic function with EF of 60 %. 30 days of cardiac event monitoring done from June 25, 2021 to July 24, 2021 to evaluate syncope. 6 transmissions are available for interpretation all consistent with normal sinus rhythm. Average heart rate is 87 bpm.  Minimum rate of 60 bpm occurred on June 27 at 6:42 AM and the maximum rate of 1 and 61 bpm occurred on July 22 at 1:05 PM.  Patient reported fatigue and lightheadedness during sinus tachycardia,125 bpm on July 7, 2021 at 8:47 AM.     Conclusion: 30 days of cardiac monitoring negative for any clinically significant arrhythmias. More recent lab work from July 16, 2021 from PCP are reviewed in care everywhere. Electrolytes and LFTs are normal.  ALT improved from 99 on February 8 2021-62 on July 16, 2021. BUN was 7 and creatinine 0.80. TSH 0.81. Hemoglobin 13.9 hematocrit 42 and platelet count 798,167. Total cholesterol 115, triglycerides 116, HDL 32 and LDL 60 on Lipitor 20 mg daily.     Lab Results   Component Value Date    WBC 8.5 06/08/2021    HGB 13.6 06/08/2021    HCT 41.0 06/08/2021     06/08/2021     Lab Results   Component Value Date    CHOL 155 06/08/2021    TRIG 208 (H) 06/08/2021    HDL 36 (L) 06/08/2021    LDLCALC 82 09/22/2020 LDLDIRECT 101 (H) 06/08/2021     Lab Results   Component Value Date    BUN 12 06/08/2021    CREATININE 0.9 06/08/2021     06/08/2021    K 4.2 06/08/2021     Lab Results   Component Value Date    INR 0.9 12/26/2017     Lab Results   Component Value Date    LABA1C 5.9 06/08/2021     ASSESSMENT/PLAN:    1. Preoperative clearance  Assessment & Plan:  Patient is stable from cardiovascular standpoint to go through spinal surgery and he is considered low cardiac risk based on today's clinical evaluation and recent noninvasive cardiovascular evaluation. Multiple questions are addressed and he verbalized understanding. 2. S/P CABG x 2, lima-lad svg-cx 6/2012  Assessment & Plan:  Clinically stable. Recent noninvasive cardiac work-up is negative for any significant ischemic heart disease or LV dysfunction or significant valvular disease. Continue aggressive risk factor modification for secondary prevention. 3. ASMITA on CPAP  Assessment & Plan:  Off of CPAP 3 years ago. Did not help him. 4. Mixed hyperlipidemia  Assessment & Plan:  Well-controlled on Lipitor 20 mg daily with recent LDL reported 62. Continue the same. 5. Syncope and collapse  Assessment & Plan:  Cardiac work-up is negative for any significant cardiac etiology or arrhythmias. History is very suggestive that it was related to heat exhaustion and transient hypotension after sublingual nitroglycerin. Patient is counseled to hydrate himself well especially if he is working outdoors and use nitroglycerin as recommended for more typical cardiac symptoms. He verbalized understanding. Patient is stable from cardiovascular standpoint and considered low cardiac risk from cervical spinal surgery. Continue current cardiovascular medications which have been reviewed and discussed individually with you. Counseled to keep well hydrated if doing outdoor activities in summer. Multiple questions answered.  Follow-up in 6 months in [place] , sooner if needed. On this date 7/29/2021 I have spent 45 minutes reviewing previous notes, test results and face to face with the patient discussing the diagnosis and importance of compliance with the treatment plan as well as documenting on the day of the visit. An electronic signature was used to authenticate this note.     --Elisabeth Martin MD

## 2021-07-29 NOTE — ASSESSMENT & PLAN NOTE
Cardiac work-up is negative for any significant cardiac etiology or arrhythmias. History is very suggestive that it was related to heat exhaustion and transient hypotension after sublingual nitroglycerin. Patient is counseled to hydrate himself well especially if he is working outdoors and use nitroglycerin as recommended for more typical cardiac symptoms. He verbalized understanding.

## 2021-07-29 NOTE — ASSESSMENT & PLAN NOTE
Patient is stable from cardiovascular standpoint to go through spinal surgery and he is considered low cardiac risk based on today's clinical evaluation and recent noninvasive cardiovascular evaluation. Multiple questions are addressed and he verbalized understanding.

## 2021-07-29 NOTE — ASSESSMENT & PLAN NOTE
Clinically stable. Recent noninvasive cardiac work-up is negative for any significant ischemic heart disease or LV dysfunction or significant valvular disease. Continue aggressive risk factor modification for secondary prevention.

## 2021-07-29 NOTE — PATIENT INSTRUCTIONS
Patient is stable from cardiovascular standpoint and considered low cardiac risk from cervical spinal surgery. Continue current cardiovascular medications which have been reviewed and discussed individually with you. Counseled to keep well hydrated if doing outdoor activities in summer. Multiple questions answered. Follow-up in 6 months in [facility] , sooner if needed.

## 2021-08-17 ENCOUNTER — HOSPITAL ENCOUNTER (OUTPATIENT)
Dept: NEUROLOGY | Age: 66
Discharge: HOME | End: 2021-08-17
Payer: MEDICARE

## 2021-08-17 DIAGNOSIS — R56.9 SEIZURE (HCC): ICD-10-CM

## 2021-08-17 PROCEDURE — 95816 EEG AWAKE AND DROWSY: CPT | Performed by: STUDENT IN AN ORGANIZED HEALTH CARE EDUCATION/TRAINING PROGRAM

## 2021-08-17 PROCEDURE — 95819 EEG AWAKE AND ASLEEP: CPT

## 2021-08-17 NOTE — PROCEDURES
ROUTINE ELECTROENCEPHALOGRAM    Identifying Information:  Name: Elizabeth Ricci  MRN: [id number]  : 1955  Interpreting Physician: Marlys Vargas DO  Referring Physician: Danielle Dale NP  Date of EE21  Procedure Location: Outpatient     Clinical History:  Elizabeth Ricci is a 78 y.o. male presenting with seizure like activity and syncope. Indication:  Rule out seizure/seizure disorder     Technical Summary:  28 channels of EEG were recorded in a digital format on a patient who was reported to be awake and drowsy during the recording. The patient was not sleep deprived prior to the EEG. The PDR consisted of well-developed, well-regulated 10-11 Hz alpha activity, maximal over the posterior head regions and reactive to eye opening and closure. Photic stimulation and hyperventilation were performed and did not produce any abnormalities. During the recording stage II sleep was not seen. The EKG lead revealed no rhythm abnormalities. EEG Interpretation: This EEG was within normal limits for a patient of this age in the awake and drowsy states. No focal, lateralizing, or epileptiform features were seen during the recording. Clinical correlation is recommended.     Marlys Vargas DO  2021 12:31 PM

## 2021-08-28 PROBLEM — Z01.818 PREOPERATIVE CLEARANCE: Status: RESOLVED | Noted: 2021-07-29 | Resolved: 2021-08-28

## 2021-09-08 ENCOUNTER — HOSPITAL ENCOUNTER (OUTPATIENT)
Age: 66
Discharge: HOME | End: 2021-09-08
Payer: MEDICARE

## 2021-09-08 ENCOUNTER — HOSPITAL ENCOUNTER (OUTPATIENT)
Dept: PREADMISSION TESTING | Age: 66
Discharge: HOME | End: 2021-09-12
Payer: MEDICARE

## 2021-09-08 ENCOUNTER — HOSPITAL ENCOUNTER (OUTPATIENT)
Dept: LAB | Age: 66
Discharge: HOME | End: 2021-09-08
Payer: MEDICARE

## 2021-09-08 VITALS
HEART RATE: 78 BPM | WEIGHT: 228 LBS | SYSTOLIC BLOOD PRESSURE: 127 MMHG | OXYGEN SATURATION: 94 % | RESPIRATION RATE: 18 BRPM | HEIGHT: 68 IN | DIASTOLIC BLOOD PRESSURE: 84 MMHG | BODY MASS INDEX: 34.56 KG/M2 | TEMPERATURE: 96 F

## 2021-09-08 LAB
ANION GAP SERPL CALCULATED.3IONS-SCNC: 10 MMOL/L (ref 4–16)
APTT: 33.8 SECONDS (ref 25.1–37.1)
BACTERIA: NEGATIVE /HPF
BASOPHILS ABSOLUTE: 0.1 K/CU MM
BASOPHILS RELATIVE PERCENT: 0.7 % (ref 0–1)
BILIRUBIN URINE: NEGATIVE MG/DL
BLOOD, URINE: NEGATIVE
BUN BLDV-MCNC: 13 MG/DL (ref 6–23)
CALCIUM SERPL-MCNC: 9.5 MG/DL (ref 8.3–10.6)
CHLORIDE BLD-SCNC: 103 MMOL/L (ref 99–110)
CLARITY: CLEAR
CO2: 26 MMOL/L (ref 21–32)
COLOR: YELLOW
CREAT SERPL-MCNC: 0.9 MG/DL (ref 0.9–1.3)
DIFFERENTIAL TYPE: ABNORMAL
EOSINOPHILS ABSOLUTE: 0.3 K/CU MM
EOSINOPHILS RELATIVE PERCENT: 2.9 % (ref 0–3)
ESTIMATED AVERAGE GLUCOSE: 131 MG/DL
GFR AFRICAN AMERICAN: >60 ML/MIN/1.73M2
GFR NON-AFRICAN AMERICAN: >60 ML/MIN/1.73M2
GLUCOSE BLD-MCNC: 87 MG/DL (ref 70–99)
GLUCOSE, URINE: NEGATIVE MG/DL
HBA1C MFR BLD: 6.2 % (ref 4.2–6.3)
HCT VFR BLD CALC: 43.2 % (ref 42–52)
HEMOGLOBIN: 14.2 GM/DL (ref 13.5–18)
IMMATURE NEUTROPHIL %: 0.2 % (ref 0–0.43)
INR BLD: 0.98 INDEX
KETONES, URINE: NEGATIVE MG/DL
LEUKOCYTE ESTERASE, URINE: NEGATIVE
LYMPHOCYTES ABSOLUTE: 3 K/CU MM
LYMPHOCYTES RELATIVE PERCENT: 34.6 % (ref 24–44)
MCH RBC QN AUTO: 30.2 PG (ref 27–31)
MCHC RBC AUTO-ENTMCNC: 32.9 % (ref 32–36)
MCV RBC AUTO: 91.9 FL (ref 78–100)
MONOCYTES ABSOLUTE: 1 K/CU MM
MONOCYTES RELATIVE PERCENT: 11.1 % (ref 0–4)
NITRITE URINE, QUANTITATIVE: NEGATIVE
NUCLEATED RBC %: 0 %
PDW BLD-RTO: 12.3 % (ref 11.7–14.9)
PH, URINE: 7 (ref 5–8)
PLATELET # BLD: 254 K/CU MM (ref 140–440)
PMV BLD AUTO: 9.6 FL (ref 7.5–11.1)
POTASSIUM SERPL-SCNC: 4.6 MMOL/L (ref 3.5–5.1)
PROTEIN UA: NEGATIVE MG/DL
PROTHROMBIN TIME: 12.7 SECONDS (ref 11.7–14.5)
RBC # BLD: 4.7 M/CU MM (ref 4.6–6.2)
RBC URINE: <1 /HPF (ref 0–3)
SEGMENTED NEUTROPHILS ABSOLUTE COUNT: 4.4 K/CU MM
SEGMENTED NEUTROPHILS RELATIVE PERCENT: 50.5 % (ref 36–66)
SODIUM BLD-SCNC: 139 MMOL/L (ref 135–145)
SPECIFIC GRAVITY UA: 1.01 (ref 1–1.03)
TOTAL IMMATURE NEUTOROPHIL: 0.02 K/CU MM
TOTAL NUCLEATED RBC: 0 K/CU MM
TRICHOMONAS: NORMAL /HPF
UROBILINOGEN, URINE: NEGATIVE MG/DL (ref 0.2–1)
WBC # BLD: 8.8 K/CU MM (ref 4–10.5)
WBC UA: <1 /HPF (ref 0–2)

## 2021-09-08 PROCEDURE — 85025 COMPLETE CBC W/AUTO DIFF WBC: CPT

## 2021-09-08 PROCEDURE — 81001 URINALYSIS AUTO W/SCOPE: CPT

## 2021-09-08 PROCEDURE — 85610 PROTHROMBIN TIME: CPT

## 2021-09-08 PROCEDURE — U0003 INFECTIOUS AGENT DETECTION BY NUCLEIC ACID (DNA OR RNA); SEVERE ACUTE RESPIRATORY SYNDROME CORONAVIRUS 2 (SARS-COV-2) (CORONAVIRUS DISEASE [COVID-19]), AMPLIFIED PROBE TECHNIQUE, MAKING USE OF HIGH THROUGHPUT TECHNOLOGIES AS DESCRIBED BY CMS-2020-01-R: HCPCS

## 2021-09-08 PROCEDURE — 85730 THROMBOPLASTIN TIME PARTIAL: CPT

## 2021-09-08 PROCEDURE — U0005 INFEC AGEN DETEC AMPLI PROBE: HCPCS

## 2021-09-08 PROCEDURE — 80048 BASIC METABOLIC PNL TOTAL CA: CPT

## 2021-09-08 PROCEDURE — 83036 HEMOGLOBIN GLYCOSYLATED A1C: CPT

## 2021-09-08 PROCEDURE — 93005 ELECTROCARDIOGRAM TRACING: CPT | Performed by: NEUROLOGICAL SURGERY

## 2021-09-08 PROCEDURE — 36415 COLL VENOUS BLD VENIPUNCTURE: CPT

## 2021-09-08 ASSESSMENT — PAIN DESCRIPTION - LOCATION: LOCATION: NECK

## 2021-09-08 ASSESSMENT — PAIN SCALES - GENERAL: PAINLEVEL_OUTOF10: 3

## 2021-09-08 ASSESSMENT — PAIN DESCRIPTION - FREQUENCY: FREQUENCY: CONTINUOUS

## 2021-09-08 ASSESSMENT — PAIN DESCRIPTION - PAIN TYPE: TYPE: CHRONIC PAIN

## 2021-09-08 ASSESSMENT — PAIN DESCRIPTION - DESCRIPTORS: DESCRIPTORS: ACHING

## 2021-09-08 NOTE — PROGRESS NOTES
Your surgery is scheduled for 1030 on 9/14/2021, you will be called with an arrival time, the day before surgery. 1. Do not eat or drink anything after midnight - unless instructed by your doctor prior to surgery. This includes                   no water, chewing gum or mints. 2. Follow your directions as prescribed by the doctor for your procedure and medications. 3. Check with your Doctor regarding stopping Plavix, Coumadin, Lovenox,Effient,Pradaxa,Xarelto, Fragmin or other blood thinners and                   follow their instructions. 4. Do not smoke, and do not drink any alcoholic beverages 24 hours prior to surgery. This includes NA Beer. 5. You may brush your teeth and gargle the morning of surgery. DO NOT SWALLOW WATER   6. You MUST make arrangements for a responsible adult to take you home after your surgery and be able to check on you every couple                   hours for the day. You will not be allowed to leave alone or drive yourself home. It is strongly suggested someone stay with you the first 24                   hrs. Your surgery will be cancelled if you do not have a ride home. 7. Please wear simple, loose fitting clothing to the hospital.  Leticia Johnson not bring valuables (money, credit cards, checkbooks, etc.) Do not wear any                   makeup (including no eye makeup) or nail polish on your fingers or toes. 8. DO NOT wear any jewelry or piercings on day of surgery. All body piercing jewelry must be removed. 9. If you have dentures, they will be removed before going to the OR; we will provide you a container. If you wear contact lenses or glasses,                  they will be removed; please bring a case for them. 10. If you  have a Living Will and Durable Power of  for Healthcare, please bring in a copy.            11. Please bring picture ID,  insurance card, paperwork from the doctors office    (H & P, Consent, & card for implantable devices). 12. Take a shower the night before or morning of your procedure, do not apply any lotion, oil or powder. 13. Wear a mask covering your nose & mouth when entering the hospital. Have your covid-19 test performed within 2-7 days of your                  surgery. Quarantine yourself after the test until after your surgery.

## 2021-09-09 LAB
EKG ATRIAL RATE: 76 BPM
EKG DIAGNOSIS: NORMAL
EKG P AXIS: 31 DEGREES
EKG P-R INTERVAL: 156 MS
EKG Q-T INTERVAL: 390 MS
EKG QRS DURATION: 86 MS
EKG QTC CALCULATION (BAZETT): 438 MS
EKG R AXIS: 23 DEGREES
EKG T AXIS: 31 DEGREES
EKG VENTRICULAR RATE: 76 BPM

## 2021-09-09 PROCEDURE — 93010 ELECTROCARDIOGRAM REPORT: CPT | Performed by: INTERNAL MEDICINE

## 2021-09-10 LAB
SARS-COV-2: NOT DETECTED
SOURCE: NORMAL

## 2021-09-15 ENCOUNTER — ANESTHESIA EVENT (OUTPATIENT)
Dept: OPERATING ROOM | Age: 66
End: 2021-09-15
Payer: MEDICARE

## 2021-09-15 NOTE — ANESTHESIA PRE PROCEDURE
Department of Anesthesiology  Preprocedure Note       Name:  Joey Tadeo   Age:  78 y.o.  :  1955                                          MRN:  [id number]         Date:  9/15/2021      Surgeon: Tera Frazier):  Dorota Goldstein MD    Procedure: Procedure(s):  C5-6 AND C6-7 ANTERIOR CERVICAL DISCECTOMY AND FUSION, C5-6 AND C6-7 INTERBODY DEVICES, MORSELIZED ALLOGRAFT, ANTERIOR C5-7 INSTRUMENTATION WITH NEURO MONITORING/TIVA    Medications prior to admission:   Prior to Admission medications    Medication Sig Start Date End Date Taking? Authorizing Provider   pantoprazole (PROTONIX) 40 MG tablet Take 1 tablet by mouth every morning (before breakfast)  Patient not taking: Reported on 2021   Heather Mena MD   atorvastatin (LIPITOR) 20 MG tablet Take 20 mg by mouth nightly    Historical Provider, MD   nitroGLYCERIN (NITROSTAT) 0.4 MG SL tablet Place 1 tablet under the tongue every 5 minutes as needed for Chest pain 20   Vanesa Oliveros MD   SYNTHROID 137 MCG tablet TAKE 1 TABLET BY MOUTH EVERY DAY 19   Historical Provider, MD   lisinopril (PRINIVIL;ZESTRIL) 10 MG tablet Take 10 mg by mouth daily    Historical Provider, MD   aspirin EC 81 MG EC tablet Take 1 tablet by mouth daily. Patient taking differently: Take 81 mg by mouth nightly  12   Ascencion Hutchison MD       Current medications:    No current facility-administered medications for this encounter.      Current Outpatient Medications   Medication Sig Dispense Refill    pantoprazole (PROTONIX) 40 MG tablet Take 1 tablet by mouth every morning (before breakfast) (Patient not taking: Reported on 2021) 30 tablet 3    atorvastatin (LIPITOR) 20 MG tablet Take 20 mg by mouth nightly      nitroGLYCERIN (NITROSTAT) 0.4 MG SL tablet Place 1 tablet under the tongue every 5 minutes as needed for Chest pain 25 tablet 3    SYNTHROID 137 MCG tablet TAKE 1 TABLET BY MOUTH EVERY DAY  5    lisinopril (PRINIVIL;ZESTRIL) 10 MG tablet Take 10 mg by mouth daily      aspirin EC 81 MG EC tablet Take 1 tablet by mouth daily. (Patient taking differently: Take 81 mg by mouth nightly ) 300 tablet 1       Allergies: Allergies   Allergen Reactions    Clopidogrel Anaphylaxis     petechiae    Simvastatin Other (See Comments)     myalagia       Problem List:    Patient Active Problem List   Diagnosis Code    Hyperlipidemia E78.5    ASMITA on CPAP G47.33, Z99.89    S/P CABG x 2, lima-lad svg-cx 6/2012 Z95.1    Right inguinal hernia K40.90    Skin tags, multiple acquired L91.8    Pyogenic arthritis of right knee joint ([address] M00.9    Osteoarthritis of right knee M17.11    Left shoulder pain M25.512    Acute CVA (cerebrovascular accident) ([address] I63.9    Cervical myelopathy with cervical radiculopathy (HCC) G95.9, M54.12    Syncope and collapse R55       Past Medical History:        Diagnosis Date    Arthritis     CAD (coronary artery disease) 06/11/2012    s/p lima-lad svg-cx    H/O 24 hour EKG monitoring 06/11/2012    predominant sinus rhythm infrequent ventricular and supraventricular ectopy noted    H/O cardiac catheterization 06/16/2012    LM distal 70 LAD no disease CX no disease RCA dominant no disease LV ef 55    H/O cardiovascular stress test 7/2/19,6/12, 12/12/13    Normal Study negative for ischemia. Normal LV size and systolic function. Ejection fraction is 56 %. Exercise tolerance is fair for age as patient exercised for 4 minutes on standard [facility] protocol.  Normal hemodynamic response to exercise    H/O chest x-ray 06/22/2012    stable opacification of the LLL consistent with a small left pleural effusion and atelectasis or pneumonia    H/O Doppler ultrasound 6/16/2012 carotid    mild plaquing in the common carotid arteries and carotid bulb no physiologically significant stenosis is identified by grayscale or duplex techniques in the extracranial carotid systems    H/O echocardiogram 12/31/13, 8/7/2012    Mild left ventr.hypertrophy EF 50-55%    Hashimoto disease     History of cardiac monitoring 2021    30 days of cardiac monitoring negative for any clinically significant arrhythmias    History of CT scan of brain 2012    no acute intracranial abnormality identified  no significant interval change compared to prior studyt    History of CT scan of chest 2012    multiple subcentimeter bilateral pulmonary nodules indeterminate for benign nodues     Hyperlipidemia     Hypothyroidism 2012    Left shoulder pain 2019    ? angina    Obesity (BMI 30.0-34. 9)     Osteoarthritis of right knee 2018    Prolonged emergence from general anesthesia     S/P CABG x 2 2012    s/p CABG x 2 lima-lad svg-cx 2012    TIA     times three    TIA (transient ischemic attack) 2012    seen by Dr Marivel Quintana       Past Surgical History:        Procedure Laterality Date    APPENDECTOMY      [facility] CORONARY ARTERY BYPASS GRAFT  2012    lima-lad svg-cx    INGUINAL HERNIA REPAIR Right 2016    with On-Q pain ball       Social History:    Social History     Tobacco Use    Smoking status: Former Smoker     Packs/day: 1.00     Years: 43.00     Pack years: 43.00     Quit date: 2010     Years since quittin.1    Smokeless tobacco: Never Used   Substance Use Topics    Alcohol use: Yes     Comment:  occasionally has 1 beer                                Counseling given: Not Answered      Vital Signs (Current): There were no vitals filed for this visit.                                            BP Readings from Last 3 Encounters:   21 127/84   21 118/78   06/10/21 118/85       NPO Status:                                                                                 BMI:   Wt Readings from Last 3 Encounters:   21 228 lb (103.4 kg)   21 230 lb (104.3 kg)   21 226 lb 10.1 oz (102.8 kg)     There systolic function is lnormal.   Ejection fraction is visually estimated at 50%. Moderate left ventricular hypertrophy. Moderately dilated right ventricle. No significant valvular disease noted. No evidence of any pericardial effusion. Bubble study was negative. 2021 stress   Summary   Normal tracer uptake in all segments of myocardium on stress ans rest   images. Normal Stress nuclear scintigraphic study suggestive of normal   myocardial perfusion. Gated images demonstrate normal left ventricular   systolic function with EF of 60 %. Neuro/Psych:   (+) CVA:, neuromuscular disease:, TIA,              ROS comment: Reports \"TIA\" vs arrhythmia symptoms where passes out yet multiple studies have failed to identify an etiology GI/Hepatic/Renal:             Endo/Other:    (+) hypothyroidism::., .                 Abdominal:             Vascular: Other Findings:           Anesthesia Plan      general     ASA 3       Induction: intravenous. MIPS: Postoperative opioids intended and Prophylactic antiemetics administered. Anesthetic plan and risks discussed with patient and spouse. Use of blood products discussed with patient and spouse whom. Plan discussed with CRNA.                 EDIL Hale - CRNA   9/15/2021

## 2021-09-16 ENCOUNTER — HOSPITAL ENCOUNTER (OUTPATIENT)
Age: 66
Setting detail: OBSERVATION
Discharge: HOME | End: 2021-09-17
Attending: NEUROLOGICAL SURGERY | Admitting: NEUROLOGICAL SURGERY
Payer: MEDICARE

## 2021-09-16 ENCOUNTER — ANESTHESIA (OUTPATIENT)
Dept: OPERATING ROOM | Age: 66
End: 2021-09-16
Payer: MEDICARE

## 2021-09-16 ENCOUNTER — APPOINTMENT (OUTPATIENT)
Dept: GENERAL RADIOLOGY | Age: 66
End: 2021-09-16
Attending: NEUROLOGICAL SURGERY
Payer: MEDICARE

## 2021-09-16 VITALS
TEMPERATURE: 96.7 F | DIASTOLIC BLOOD PRESSURE: 76 MMHG | OXYGEN SATURATION: 86 % | SYSTOLIC BLOOD PRESSURE: 118 MMHG | RESPIRATION RATE: 4 BRPM

## 2021-09-16 DIAGNOSIS — Z98.1 S/P CERVICAL SPINAL FUSION: Primary | ICD-10-CM

## 2021-09-16 PROBLEM — M47.12 CERVICAL SPONDYLOSIS WITH MYELOPATHY: Status: ACTIVE | Noted: 2021-09-16

## 2021-09-16 PROCEDURE — 3600000005 HC SURGERY LEVEL 5 BASE: Performed by: NEUROLOGICAL SURGERY

## 2021-09-16 PROCEDURE — C9359 IMPLNT,BON VOID FILLER-PUTTY: HCPCS | Performed by: NEUROLOGICAL SURGERY

## 2021-09-16 PROCEDURE — 7100000001 HC PACU RECOVERY - ADDTL 15 MIN: Performed by: NEUROLOGICAL SURGERY

## 2021-09-16 PROCEDURE — 2580000003 HC RX 258: Performed by: ANESTHESIOLOGY

## 2021-09-16 PROCEDURE — 3700000000 HC ANESTHESIA ATTENDED CARE: Performed by: NEUROLOGICAL SURGERY

## 2021-09-16 PROCEDURE — 76000 FLUOROSCOPY <1 HR PHYS/QHP: CPT

## 2021-09-16 PROCEDURE — 2720000010 HC SURG SUPPLY STERILE: Performed by: NEUROLOGICAL SURGERY

## 2021-09-16 PROCEDURE — 3700000001 HC ADD 15 MINUTES (ANESTHESIA): Performed by: NEUROLOGICAL SURGERY

## 2021-09-16 PROCEDURE — 3600000015 HC SURGERY LEVEL 5 ADDTL 15MIN: Performed by: NEUROLOGICAL SURGERY

## 2021-09-16 PROCEDURE — 2709999900 HC NON-CHARGEABLE SUPPLY: Performed by: NEUROLOGICAL SURGERY

## 2021-09-16 PROCEDURE — 6360000002 HC RX W HCPCS: Performed by: NURSE ANESTHETIST, CERTIFIED REGISTERED

## 2021-09-16 PROCEDURE — C1713 ANCHOR/SCREW BN/BN,TIS/BN: HCPCS | Performed by: NEUROLOGICAL SURGERY

## 2021-09-16 PROCEDURE — G0378 HOSPITAL OBSERVATION PER HR: HCPCS

## 2021-09-16 PROCEDURE — 2500000003 HC RX 250 WO HCPCS: Performed by: NURSE ANESTHETIST, CERTIFIED REGISTERED

## 2021-09-16 PROCEDURE — 7100000000 HC PACU RECOVERY - FIRST 15 MIN: Performed by: NEUROLOGICAL SURGERY

## 2021-09-16 PROCEDURE — 2580000003 HC RX 258: Performed by: NEUROLOGICAL SURGERY

## 2021-09-16 PROCEDURE — 2780000010 HC IMPLANT OTHER: Performed by: NEUROLOGICAL SURGERY

## 2021-09-16 PROCEDURE — 6360000002 HC RX W HCPCS: Performed by: NEUROLOGICAL SURGERY

## 2021-09-16 DEVICE — DBM T43102 1CC GRAFTON PUTTY
Type: IMPLANTABLE DEVICE | Status: FUNCTIONAL
Brand: GRAFTON®AND GRAFTON PLUS®DEMINERALIZED BONE MATRIX (DBM)

## 2021-09-16 DEVICE — SCREW 7713517 ZEVO VAR SD 3.5MM X 17MM
Type: IMPLANTABLE DEVICE | Status: FUNCTIONAL
Brand: ZEVO™ ANTERIOR CERVICAL PLATE SYSTEM

## 2021-09-16 DEVICE — PLATE 3002041 ZEVO 41MM 2 LVL
Type: IMPLANTABLE DEVICE | Status: FUNCTIONAL
Brand: ZEVO™ ANTERIOR CERVICAL PLATE SYSTEM

## 2021-09-16 RX ORDER — LIDOCAINE HYDROCHLORIDE 20 MG/ML
INJECTION, SOLUTION INTRAVENOUS PRN
Status: DISCONTINUED | OUTPATIENT
Start: 2021-09-16 | End: 2021-09-16 | Stop reason: SDUPTHER

## 2021-09-16 RX ORDER — SODIUM CHLORIDE 9 MG/ML
INJECTION, SOLUTION INTRAVENOUS CONTINUOUS
Status: DISCONTINUED | OUTPATIENT
Start: 2021-09-16 | End: 2021-09-17 | Stop reason: HOSPADM

## 2021-09-16 RX ORDER — HYDROCODONE BITARTRATE AND ACETAMINOPHEN 5; 325 MG/1; MG/1
1 TABLET ORAL EVERY 4 HOURS PRN
Status: DISCONTINUED | OUTPATIENT
Start: 2021-09-16 | End: 2021-09-17 | Stop reason: HOSPADM

## 2021-09-16 RX ORDER — HYDROCODONE BITARTRATE AND ACETAMINOPHEN 5; 325 MG/1; MG/1
2 TABLET ORAL EVERY 4 HOURS PRN
Status: DISCONTINUED | OUTPATIENT
Start: 2021-09-16 | End: 2021-09-17 | Stop reason: HOSPADM

## 2021-09-16 RX ORDER — SUCCINYLCHOLINE/SOD CL,ISO/PF 100 MG/5ML
SYRINGE (ML) INTRAVENOUS PRN
Status: DISCONTINUED | OUTPATIENT
Start: 2021-09-16 | End: 2021-09-16 | Stop reason: SDUPTHER

## 2021-09-16 RX ORDER — FENTANYL CITRATE 50 UG/ML
INJECTION, SOLUTION INTRAMUSCULAR; INTRAVENOUS PRN
Status: DISCONTINUED | OUTPATIENT
Start: 2021-09-16 | End: 2021-09-16 | Stop reason: SDUPTHER

## 2021-09-16 RX ORDER — DEXAMETHASONE SODIUM PHOSPHATE 4 MG/ML
INJECTION, SOLUTION INTRA-ARTICULAR; INTRALESIONAL; INTRAMUSCULAR; INTRAVENOUS; SOFT TISSUE PRN
Status: DISCONTINUED | OUTPATIENT
Start: 2021-09-16 | End: 2021-09-16 | Stop reason: SDUPTHER

## 2021-09-16 RX ORDER — PROMETHAZINE HYDROCHLORIDE 25 MG/ML
6.25 INJECTION, SOLUTION INTRAMUSCULAR; INTRAVENOUS
Status: DISCONTINUED | OUTPATIENT
Start: 2021-09-16 | End: 2021-09-16 | Stop reason: HOSPADM

## 2021-09-16 RX ORDER — LABETALOL HYDROCHLORIDE 5 MG/ML
5 INJECTION, SOLUTION INTRAVENOUS EVERY 10 MIN PRN
Status: DISCONTINUED | OUTPATIENT
Start: 2021-09-16 | End: 2021-09-16 | Stop reason: HOSPADM

## 2021-09-16 RX ORDER — MORPHINE SULFATE 2 MG/ML
2 INJECTION, SOLUTION INTRAMUSCULAR; INTRAVENOUS EVERY 5 MIN PRN
Status: DISCONTINUED | OUTPATIENT
Start: 2021-09-16 | End: 2021-09-16 | Stop reason: HOSPADM

## 2021-09-16 RX ORDER — CEFAZOLIN SODIUM 2 G/100ML
2000 INJECTION, SOLUTION INTRAVENOUS ONCE
Status: COMPLETED | OUTPATIENT
Start: 2021-09-16 | End: 2021-09-16

## 2021-09-16 RX ORDER — FENTANYL CITRATE 50 UG/ML
25 INJECTION, SOLUTION INTRAMUSCULAR; INTRAVENOUS EVERY 5 MIN PRN
Status: DISCONTINUED | OUTPATIENT
Start: 2021-09-16 | End: 2021-09-16 | Stop reason: HOSPADM

## 2021-09-16 RX ORDER — SODIUM CHLORIDE, SODIUM LACTATE, POTASSIUM CHLORIDE, CALCIUM CHLORIDE 600; 310; 30; 20 MG/100ML; MG/100ML; MG/100ML; MG/100ML
INJECTION, SOLUTION INTRAVENOUS CONTINUOUS
Status: DISCONTINUED | OUTPATIENT
Start: 2021-09-16 | End: 2021-09-17 | Stop reason: HOSPADM

## 2021-09-16 RX ORDER — ROCURONIUM BROMIDE 10 MG/ML
INJECTION, SOLUTION INTRAVENOUS PRN
Status: DISCONTINUED | OUTPATIENT
Start: 2021-09-16 | End: 2021-09-16 | Stop reason: SDUPTHER

## 2021-09-16 RX ORDER — KETAMINE HCL 50MG/ML(1)
SYRINGE (ML) INTRAVENOUS PRN
Status: DISCONTINUED | OUTPATIENT
Start: 2021-09-16 | End: 2021-09-16 | Stop reason: SDUPTHER

## 2021-09-16 RX ORDER — ONDANSETRON 2 MG/ML
INJECTION INTRAMUSCULAR; INTRAVENOUS PRN
Status: DISCONTINUED | OUTPATIENT
Start: 2021-09-16 | End: 2021-09-16 | Stop reason: SDUPTHER

## 2021-09-16 RX ORDER — HYDROMORPHONE HCL 110MG/55ML
0.5 PATIENT CONTROLLED ANALGESIA SYRINGE INTRAVENOUS EVERY 5 MIN PRN
Status: DISCONTINUED | OUTPATIENT
Start: 2021-09-16 | End: 2021-09-16 | Stop reason: HOSPADM

## 2021-09-16 RX ORDER — HYDRALAZINE HYDROCHLORIDE 20 MG/ML
5 INJECTION INTRAMUSCULAR; INTRAVENOUS EVERY 10 MIN PRN
Status: DISCONTINUED | OUTPATIENT
Start: 2021-09-16 | End: 2021-09-16 | Stop reason: HOSPADM

## 2021-09-16 RX ORDER — PROPOFOL 10 MG/ML
INJECTION, EMULSION INTRAVENOUS PRN
Status: DISCONTINUED | OUTPATIENT
Start: 2021-09-16 | End: 2021-09-16 | Stop reason: SDUPTHER

## 2021-09-16 RX ORDER — KETOROLAC TROMETHAMINE 30 MG/ML
INJECTION, SOLUTION INTRAMUSCULAR; INTRAVENOUS PRN
Status: DISCONTINUED | OUTPATIENT
Start: 2021-09-16 | End: 2021-09-16 | Stop reason: SDUPTHER

## 2021-09-16 RX ORDER — HYDROMORPHONE HCL 110MG/55ML
0.25 PATIENT CONTROLLED ANALGESIA SYRINGE INTRAVENOUS EVERY 5 MIN PRN
Status: DISCONTINUED | OUTPATIENT
Start: 2021-09-16 | End: 2021-09-16 | Stop reason: HOSPADM

## 2021-09-16 RX ADMIN — DEXAMETHASONE SODIUM PHOSPHATE 8 MG: 4 INJECTION, SOLUTION INTRAMUSCULAR; INTRAVENOUS at 20:21

## 2021-09-16 RX ADMIN — SODIUM CHLORIDE, POTASSIUM CHLORIDE, SODIUM LACTATE AND CALCIUM CHLORIDE: 600; 310; 30; 20 INJECTION, SOLUTION INTRAVENOUS at 16:23

## 2021-09-16 RX ADMIN — CEFAZOLIN SODIUM 2000 MG: 2 INJECTION, SOLUTION INTRAVENOUS at 20:30

## 2021-09-16 RX ADMIN — FENTANYL CITRATE 50 MCG: 50 INJECTION, SOLUTION INTRAMUSCULAR; INTRAVENOUS at 20:21

## 2021-09-16 RX ADMIN — Medication 100 MG: at 20:21

## 2021-09-16 RX ADMIN — SODIUM CHLORIDE: 9 INJECTION, SOLUTION INTRAVENOUS at 22:48

## 2021-09-16 RX ADMIN — Medication 50 MG: at 21:17

## 2021-09-16 RX ADMIN — KETOROLAC TROMETHAMINE 15 MG: 30 INJECTION, SOLUTION INTRAMUSCULAR; INTRAVENOUS at 22:06

## 2021-09-16 RX ADMIN — ROCURONIUM BROMIDE 50 MG: 10 INJECTION INTRAVENOUS at 20:35

## 2021-09-16 RX ADMIN — FENTANYL CITRATE 50 MCG: 50 INJECTION, SOLUTION INTRAMUSCULAR; INTRAVENOUS at 21:41

## 2021-09-16 RX ADMIN — ONDANSETRON 4 MG: 2 INJECTION INTRAMUSCULAR; INTRAVENOUS at 20:21

## 2021-09-16 RX ADMIN — PROPOFOL 150 MG: 10 INJECTION, EMULSION INTRAVENOUS at 20:21

## 2021-09-16 RX ADMIN — LIDOCAINE HYDROCHLORIDE 100 MG: 20 INJECTION, SOLUTION INTRAVENOUS at 20:21

## 2021-09-16 RX ADMIN — SUGAMMADEX 200 MG: 100 INJECTION, SOLUTION INTRAVENOUS at 22:21

## 2021-09-16 ASSESSMENT — PULMONARY FUNCTION TESTS
PIF_VALUE: 25
PIF_VALUE: 1
PIF_VALUE: 24
PIF_VALUE: 0
PIF_VALUE: 24
PIF_VALUE: 26
PIF_VALUE: 25
PIF_VALUE: 22
PIF_VALUE: 26
PIF_VALUE: 23
PIF_VALUE: 25
PIF_VALUE: 5
PIF_VALUE: 24
PIF_VALUE: 1
PIF_VALUE: 24
PIF_VALUE: 24
PIF_VALUE: 25
PIF_VALUE: 23
PIF_VALUE: 0
PIF_VALUE: 25
PIF_VALUE: 7
PIF_VALUE: 4
PIF_VALUE: 25
PIF_VALUE: 0
PIF_VALUE: 25
PIF_VALUE: 24
PIF_VALUE: 25
PIF_VALUE: 24
PIF_VALUE: 23
PIF_VALUE: 27
PIF_VALUE: 25
PIF_VALUE: 1
PIF_VALUE: 27
PIF_VALUE: 31
PIF_VALUE: 25
PIF_VALUE: 23
PIF_VALUE: 24
PIF_VALUE: 17
PIF_VALUE: 25
PIF_VALUE: 24
PIF_VALUE: 24
PIF_VALUE: 26
PIF_VALUE: 24
PIF_VALUE: 0
PIF_VALUE: 15
PIF_VALUE: 6
PIF_VALUE: 0
PIF_VALUE: 24
PIF_VALUE: 17
PIF_VALUE: 24
PIF_VALUE: 24
PIF_VALUE: 1
PIF_VALUE: 15
PIF_VALUE: 25
PIF_VALUE: 24
PIF_VALUE: 25
PIF_VALUE: 27
PIF_VALUE: 21
PIF_VALUE: 25
PIF_VALUE: 25
PIF_VALUE: 26
PIF_VALUE: 26
PIF_VALUE: 24
PIF_VALUE: 26
PIF_VALUE: 1
PIF_VALUE: 23
PIF_VALUE: 17
PIF_VALUE: 24
PIF_VALUE: 22
PIF_VALUE: 17
PIF_VALUE: 0
PIF_VALUE: 25
PIF_VALUE: 25
PIF_VALUE: 23
PIF_VALUE: 1
PIF_VALUE: 19
PIF_VALUE: 25
PIF_VALUE: 16
PIF_VALUE: 24
PIF_VALUE: 0
PIF_VALUE: 24
PIF_VALUE: 25
PIF_VALUE: 27
PIF_VALUE: 15
PIF_VALUE: 26
PIF_VALUE: 23
PIF_VALUE: 21
PIF_VALUE: 25
PIF_VALUE: 24
PIF_VALUE: 21
PIF_VALUE: 3
PIF_VALUE: 25
PIF_VALUE: 0
PIF_VALUE: 24
PIF_VALUE: 25
PIF_VALUE: 13
PIF_VALUE: 25
PIF_VALUE: 20
PIF_VALUE: 24
PIF_VALUE: 1
PIF_VALUE: 25
PIF_VALUE: 0
PIF_VALUE: 24
PIF_VALUE: 0
PIF_VALUE: 25
PIF_VALUE: 0
PIF_VALUE: 1
PIF_VALUE: 26
PIF_VALUE: 25
PIF_VALUE: 25
PIF_VALUE: 27
PIF_VALUE: 24
PIF_VALUE: 25
PIF_VALUE: 0
PIF_VALUE: 24
PIF_VALUE: 23
PIF_VALUE: 1
PIF_VALUE: 0
PIF_VALUE: 25
PIF_VALUE: 24
PIF_VALUE: 25
PIF_VALUE: 0
PIF_VALUE: 24
PIF_VALUE: 23
PIF_VALUE: 28
PIF_VALUE: 7
PIF_VALUE: 0
PIF_VALUE: 25
PIF_VALUE: 1
PIF_VALUE: 23
PIF_VALUE: 24
PIF_VALUE: 0
PIF_VALUE: 21
PIF_VALUE: 24
PIF_VALUE: 24
PIF_VALUE: 25
PIF_VALUE: 1
PIF_VALUE: 27
PIF_VALUE: 24
PIF_VALUE: 25
PIF_VALUE: 26
PIF_VALUE: 25
PIF_VALUE: 7
PIF_VALUE: 18
PIF_VALUE: 25
PIF_VALUE: 0
PIF_VALUE: 23
PIF_VALUE: 2
PIF_VALUE: 27
PIF_VALUE: 24
PIF_VALUE: 25

## 2021-09-16 ASSESSMENT — PAIN - FUNCTIONAL ASSESSMENT: PAIN_FUNCTIONAL_ASSESSMENT: 0-10

## 2021-09-16 ASSESSMENT — PAIN DESCRIPTION - PAIN TYPE: TYPE: SURGICAL PAIN

## 2021-09-16 ASSESSMENT — PAIN SCALES - GENERAL: PAINLEVEL_OUTOF10: 0

## 2021-09-16 ASSESSMENT — PAIN DESCRIPTION - LOCATION
LOCATION: NECK;BACK
LOCATION: NECK;BACK

## 2021-09-16 ASSESSMENT — LIFESTYLE VARIABLES: SMOKING_STATUS: 0

## 2021-09-16 NOTE — H&P
Neurosurgery   H&P      Date of Admission: 9/16/2021  Subjective:   Procedure: C5-6, C6-7 ACDF     HPI:  78 y.o. 1955  Who presents today for an elective procedure that is mentioned above. Patient was last seen in the office by Dr. Sergio Samuel 6/25/21. At that time he complained of pain in his neck that radiated into his right shoulder. At times he nielsen get numbness in his right hand and forearm. He also has weakness in his right tricep and hand. He was seen in the hospital in June of this year for similar symptoms. Patient is on 81mg ASA. PMHx positive for osteoarthritis of right knee, status post CABG x2, obesity, hypothyroidism. Past surgical history positive for coronary artery bypass graft, right inguinal hernia repair, cholecystectomy. Past Medical and Surgical History:       Diagnosis Date    Arthritis     CAD (coronary artery disease) 06/11/2012    s/p lima-lad svg-cx    H/O 24 hour EKG monitoring 06/11/2012    predominant sinus rhythm infrequent ventricular and supraventricular ectopy noted    H/O cardiac catheterization 06/16/2012    LM distal 70 LAD no disease CX no disease RCA dominant no disease LV ef 55    H/O cardiovascular stress test 7/2/19,6/12, 12/12/13    Normal Study negative for ischemia. Normal LV size and systolic function. Ejection fraction is 56 %. Exercise tolerance is fair for age as patient exercised for 4 minutes on standard Stas protocol. Normal hemodynamic response to exercise    H/O chest x-ray 06/22/2012    stable opacification of the LLL consistent with a small left pleural effusion and atelectasis or pneumonia    H/O Doppler ultrasound 6/16/2012 carotid    mild plaquing in the common carotid arteries and carotid bulb no physiologically significant stenosis is identified by grayscale or duplex techniques in the extracranial carotid systems    H/O echocardiogram 12/31/13, 8/7/2012    Mild left ventr. hypertrophy EF 50-55%    Hashimoto disease     History of cardiac monitoring 06/11/2021    30 days of cardiac monitoring negative for any clinically significant arrhythmias    History of CT scan of brain 06/11/2012    no acute intracranial abnormality identified  no significant interval change compared to prior studyt    History of CT scan of chest 06/11/2012    multiple subcentimeter bilateral pulmonary nodules indeterminate for benign nodues     Hyperlipidemia     Hypothyroidism 06/11/2012    Left shoulder pain 06/25/2019    ? angina    Obesity (BMI 30.0-34. 9)     Osteoarthritis of right knee 02/26/2018    Prolonged emergence from general anesthesia     S/P CABG x 2 06/2012    s/p CABG x 2 lima-lad svg-cx 6/2012    TIA     times three    TIA (transient ischemic attack) 03/2012    seen by Dr Akin Bass         Procedure Laterality Date   Karen Kline CORONARY ARTERY BYPASS GRAFT  6/18/2012    lima-lad svg-cx    INGUINAL HERNIA REPAIR Right 07/14/2016    with On-Q pain ball       Social History:    TOBACCO:   reports that he quit smoking about 11 years ago. He has a 43.00 pack-year smoking history. He has never used smokeless tobacco.  ETOH:   reports current alcohol use. Family History:       Problem Relation Age of Onset    High Cholesterol Sister     Diabetes Sister     Stroke Brother     Heart Disease Mother     Cancer Father     Stroke Brother     Cancer Sister        Current Medications:    No current facility-administered medications for this encounter.     Allergies   Allergen Reactions    Clopidogrel Anaphylaxis     petechiae    Simvastatin Other (See Comments)     myalagia        REVIEW OF SYSTEMS:    CONSTITUTIONAL:  negative for fevers, chills, diaphoresis, activity change  EYES:  negative for blurred vision, eye discharge, visual disturbance and icterus  HEENT:  negative for hearing loss, tinnitus, ear drainage, sinus pressure  RESPIRATORY:  No cough, shortness of breath, hemoptysis  GASTROINTESTINAL:  negative for nausea, vomiting, diarrhea, constipation  GENITOURINARY:  negative for frequency, dysuria, urinary incontinence  HEMATOLOGIC/LYMPHATIC:  negative for easy bruising, bleeding and lymphadenopathy  ALLERGIC/IMMUNOLOGIC:  negative for recurrent infections, angioedema, anaphylaxis and drug reactions  NEUROLOGICAL:  positive for headaches, negative for slurred speech, unilateral weakness  PSYCHIATRIC/BEHAVIORAL: negative for hallucinations, behavioral problems, confusion and agitation. Objective:   PHYSICAL EXAM:      VITALS:  There were no vitals taken for this visit. 24HR INTAKE/OUTPUT:  No intake or output data in the 24 hours ending 09/16/21 1320  CONSTITUTIONAL:  Awake, alert, cooperative, no apparent distress, and appears stated age  [de-identified]: Teresa Hoar, EOMI. Sclera white, conjunctive full. OP with moist mucosal membranes, no thrush, tongue protrudes midline  PSYCHIATRIC: Oriented to person place and time. No obvious depression or anxiety. MUSCULOSKELETAL: No obvious misalignment or effusion of the joints. No clubbing, cyanosis of the digits. SKIN:  normal skin color, texture, turgor and no redness, warmth, or swelling. NEUROLOGIC: Alert and oriented x4, face symmetrical, no obvious droop, speech clear and coherent, sensation intact to light touch and pinprick sensation. Upper extremity strength: Bilateral upper extremities 5/5 except for right hand and tricep which is 3/5 and 4/5 respectively. Negative weiner's bilaterally  Lower extremity strength: Bilateral lower extremities 5/5 throughout. DATA:    Old records have been reviewed    CBC:  No results for input(s): WBC, RBC, HGB, HCT, PLT, MCV, MCH, MCHC, RDW, NRBC, SEGSPCT, BANDSPCT in the last 72 hours. BMP:  No results for input(s): NA, K, CL, CO2, BUN, CREATININE, CALCIUM, GLUCOSE in the last 72 hours.        Radiology Review:  All pertinent images / reports were reviewed as a part of this visit. Assessment:   Cervical spondylosis with myelopathy  Plan:   The patient presents today for the elective spine procedure mentioned above. There have been no changes in their medical history since their last neurosurgical evaluation. Risks and benefits of the surgery were discussed on 6/25/21 by Dr. Rebekah Alas. Those risks include bleeding, infection, weakness, numbness, bowel or bladder dysfunction, paralysis, cerebrospinal fluid leakage, difficulty with swallowing, hoarse voice, failure of surgery, need for further surgery, heart attack, stroke, blood clots, pneumonia, and/or death. They agreed to proceed with the discussed surgery at that time and today continue to voice consent. Electronically signed by Yennifer Warner PA-C on 9/16/2021 at 1:20 PM    Supervising physician: Carli Alas MD.  Dr. Rebekah Alas was readily and continuously available by phone for direct consultation regarding the care of this patient. Time spent with patient in consultation, education, and collaboration with medical time is >50% of total time spent on case, including time spent in chart review and dictation. Total time spent: 40 minutes    Thank you for the opportunity to participate in the care of your patient.

## 2021-09-17 ENCOUNTER — APPOINTMENT (OUTPATIENT)
Dept: GENERAL RADIOLOGY | Age: 66
End: 2021-09-17
Attending: NEUROLOGICAL SURGERY
Payer: MEDICARE

## 2021-09-17 VITALS
WEIGHT: 228 LBS | RESPIRATION RATE: 20 BRPM | BODY MASS INDEX: 34.56 KG/M2 | HEART RATE: 108 BPM | DIASTOLIC BLOOD PRESSURE: 93 MMHG | HEIGHT: 68 IN | TEMPERATURE: 97.4 F | OXYGEN SATURATION: 95 % | SYSTOLIC BLOOD PRESSURE: 144 MMHG

## 2021-09-17 LAB
ANION GAP SERPL CALCULATED.3IONS-SCNC: 14 MMOL/L (ref 4–16)
BUN BLDV-MCNC: 14 MG/DL (ref 6–23)
CALCIUM SERPL-MCNC: 8.9 MG/DL (ref 8.3–10.6)
CHLORIDE BLD-SCNC: 101 MMOL/L (ref 99–110)
CO2: 20 MMOL/L (ref 21–32)
CREAT SERPL-MCNC: 0.7 MG/DL (ref 0.9–1.3)
EKG ATRIAL RATE: 110 BPM
EKG DIAGNOSIS: NORMAL
EKG P AXIS: 37 DEGREES
EKG P-R INTERVAL: 164 MS
EKG Q-T INTERVAL: 350 MS
EKG QRS DURATION: 94 MS
EKG QTC CALCULATION (BAZETT): 473 MS
EKG R AXIS: 28 DEGREES
EKG T AXIS: 41 DEGREES
EKG VENTRICULAR RATE: 110 BPM
GFR AFRICAN AMERICAN: >60 ML/MIN/1.73M2
GFR NON-AFRICAN AMERICAN: >60 ML/MIN/1.73M2
GLUCOSE BLD-MCNC: 208 MG/DL (ref 70–99)
HCT VFR BLD CALC: 40.6 % (ref 42–52)
HEMOGLOBIN: 13.5 GM/DL (ref 13.5–18)
MCH RBC QN AUTO: 29.7 PG (ref 27–31)
MCHC RBC AUTO-ENTMCNC: 33.3 % (ref 32–36)
MCV RBC AUTO: 89.4 FL (ref 78–100)
PDW BLD-RTO: 12.3 % (ref 11.7–14.9)
PLATELET # BLD: 240 K/CU MM (ref 140–440)
PMV BLD AUTO: 9.5 FL (ref 7.5–11.1)
POTASSIUM SERPL-SCNC: 4.3 MMOL/L (ref 3.5–5.1)
RBC # BLD: 4.54 M/CU MM (ref 4.6–6.2)
SODIUM BLD-SCNC: 135 MMOL/L (ref 135–145)
TSH HIGH SENSITIVITY: 0.31 UIU/ML (ref 0.27–4.2)
WBC # BLD: 16 K/CU MM (ref 4–10.5)

## 2021-09-17 PROCEDURE — G0378 HOSPITAL OBSERVATION PER HR: HCPCS

## 2021-09-17 PROCEDURE — 80048 BASIC METABOLIC PNL TOTAL CA: CPT

## 2021-09-17 PROCEDURE — 97112 NEUROMUSCULAR REEDUCATION: CPT

## 2021-09-17 PROCEDURE — 6370000000 HC RX 637 (ALT 250 FOR IP): Performed by: PHYSICIAN ASSISTANT

## 2021-09-17 PROCEDURE — 85027 COMPLETE CBC AUTOMATED: CPT

## 2021-09-17 PROCEDURE — 71045 X-RAY EXAM CHEST 1 VIEW: CPT

## 2021-09-17 PROCEDURE — 97166 OT EVAL MOD COMPLEX 45 MIN: CPT

## 2021-09-17 PROCEDURE — 97162 PT EVAL MOD COMPLEX 30 MIN: CPT

## 2021-09-17 PROCEDURE — 72040 X-RAY EXAM NECK SPINE 2-3 VW: CPT

## 2021-09-17 PROCEDURE — 6370000000 HC RX 637 (ALT 250 FOR IP): Performed by: NEUROLOGICAL SURGERY

## 2021-09-17 PROCEDURE — 97535 SELF CARE MNGMENT TRAINING: CPT

## 2021-09-17 PROCEDURE — 97116 GAIT TRAINING THERAPY: CPT

## 2021-09-17 PROCEDURE — 97530 THERAPEUTIC ACTIVITIES: CPT

## 2021-09-17 PROCEDURE — 93005 ELECTROCARDIOGRAM TRACING: CPT | Performed by: HOSPITALIST

## 2021-09-17 PROCEDURE — 93010 ELECTROCARDIOGRAM REPORT: CPT | Performed by: INTERNAL MEDICINE

## 2021-09-17 PROCEDURE — 84443 ASSAY THYROID STIM HORMONE: CPT

## 2021-09-17 RX ORDER — ONDANSETRON 4 MG/1
4 TABLET, ORALLY DISINTEGRATING ORAL EVERY 8 HOURS PRN
Status: DISCONTINUED | OUTPATIENT
Start: 2021-09-17 | End: 2021-09-17 | Stop reason: HOSPADM

## 2021-09-17 RX ORDER — HYDROCODONE BITARTRATE AND ACETAMINOPHEN 5; 325 MG/1; MG/1
1-2 TABLET ORAL EVERY 4 HOURS PRN
Qty: 45 TABLET | Refills: 0 | Status: SHIPPED | OUTPATIENT
Start: 2021-09-17 | End: 2021-09-24

## 2021-09-17 RX ORDER — ACETAMINOPHEN 325 MG/1
650 TABLET ORAL EVERY 4 HOURS PRN
Status: DISCONTINUED | OUTPATIENT
Start: 2021-09-17 | End: 2021-09-17 | Stop reason: HOSPADM

## 2021-09-17 RX ORDER — LISINOPRIL 10 MG/1
10 TABLET ORAL DAILY
Status: DISCONTINUED | OUTPATIENT
Start: 2021-09-17 | End: 2021-09-17 | Stop reason: HOSPADM

## 2021-09-17 RX ORDER — POLYETHYLENE GLYCOL 3350 17 G/17G
17 POWDER, FOR SOLUTION ORAL DAILY
Status: DISCONTINUED | OUTPATIENT
Start: 2021-09-17 | End: 2021-09-17 | Stop reason: HOSPADM

## 2021-09-17 RX ORDER — METHOCARBAMOL 500 MG/1
500 TABLET, FILM COATED ORAL 3 TIMES DAILY
Qty: 30 TABLET | Refills: 0 | Status: SHIPPED | OUTPATIENT
Start: 2021-09-17 | End: 2021-09-27

## 2021-09-17 RX ORDER — ONDANSETRON 2 MG/ML
4 INJECTION INTRAMUSCULAR; INTRAVENOUS EVERY 6 HOURS PRN
Status: DISCONTINUED | OUTPATIENT
Start: 2021-09-17 | End: 2021-09-17 | Stop reason: HOSPADM

## 2021-09-17 RX ADMIN — POLYETHYLENE GLYCOL 3350 17 G: 17 POWDER, FOR SOLUTION ORAL at 08:11

## 2021-09-17 RX ADMIN — ACETAMINOPHEN 650 MG: 325 TABLET ORAL at 13:16

## 2021-09-17 RX ADMIN — LISINOPRIL 10 MG: 10 TABLET ORAL at 08:12

## 2021-09-17 RX ADMIN — LEVOTHYROXINE SODIUM 137 MCG: 25 TABLET ORAL at 08:11

## 2021-09-17 RX ADMIN — BISACODYL 5 MG: 5 TABLET, COATED ORAL at 08:12

## 2021-09-17 ASSESSMENT — PAIN DESCRIPTION - PAIN TYPE
TYPE: SURGICAL PAIN

## 2021-09-17 ASSESSMENT — PAIN SCALES - GENERAL
PAINLEVEL_OUTOF10: 0
PAINLEVEL_OUTOF10: 1
PAINLEVEL_OUTOF10: 4
PAINLEVEL_OUTOF10: 4
PAINLEVEL_OUTOF10: 0
PAINLEVEL_OUTOF10: 1
PAINLEVEL_OUTOF10: 2
PAINLEVEL_OUTOF10: 1

## 2021-09-17 ASSESSMENT — PAIN DESCRIPTION - DESCRIPTORS
DESCRIPTORS: ACHING

## 2021-09-17 ASSESSMENT — PAIN DESCRIPTION - ORIENTATION
ORIENTATION: RIGHT
ORIENTATION: RIGHT

## 2021-09-17 ASSESSMENT — PAIN DESCRIPTION - LOCATION
LOCATION: SHOULDER
LOCATION: SHOULDER
LOCATION: BACK;NECK
LOCATION: SHOULDER

## 2021-09-17 NOTE — PROGRESS NOTES
Patient awake, voided clear yellow urine without difficulty. Anterior neck incision intact and without drainage, PIYUSH drain intact and compressed. C-spine and L-spine assessment completed as ordered see flow sheet. Patient continues to deny any discomfort at this time. Patient repositioned in bed for comfort. No other needs noted at this time.

## 2021-09-17 NOTE — PROGRESS NOTES
Patients HR alarming high when entering the room it appeared that the patient was having a bad dream.  I awakened the patient and his HR went down

## 2021-09-17 NOTE — PROGRESS NOTES
223 - transferred from OR on bed, monitor applied, alarms on and verified, bedside handoff provided by Saba LUGO and Jose CRNA  [address] Manoj KAISER at bedside evaluating patient  [phone] - linens straightened and changed; patient tolerated well  2060 - phase one care complete; transferred to [facility] room 7

## 2021-09-17 NOTE — OP NOTE
incision was made with scalpel. Cautery was used to divide the platysma. Cautery was then used to develop a plane of dissection medial to the sternocleidomastoid muscle. Blunt finger dissection was then used deep in the plane of dissection medial to the carotid sheath and into the prevertebral space. Careful hemostasis was obtained as appropriate with bipolar cautery. The C6-7 level was identified. The longus coli muscles were dissected laterally with bipolar cautery. Retractor was positioned. [place] pins were placed into the bodies adjacent to the C6-7 disc space and gentle distraction performed. The operative microscope was used to provide illumination and magnification. Anterior C6-7 annulotomy was performed and the disc removed with pituitary rongeur. The cartilaginous endplates were also removed with straight curette. Anterior and posterior osteophytes were removed with high-speed drill. The posterior longitudinal ligament was entered with a microcurette and removed with Kerrison rongeur. Epidural hemostasis was achieved with static agents. Copious irrigation was performed. Sequential trialing was performed and an appropriate interbody device selected. The interbody device was packed with morselized allograft and then inserted with fluoroscopy. A similar procedure was performed at C5-6. MEP and SSEP remained stable. An anterior cervical plate was fixated from C5-C7. Hemostasis was ensured. Copious irrigation was performed. Counts were verified to be correct. Closure was performed with 2-0 Vicryl in the platysma and 3-0 Vicryl in the dermis. Dermabond was placed on the skin. The patient was extubated in the operating room and transported to PACU in stable condition.       Electronically signed by Thien Gupta MD on 9/16/2021 at 10:13 PM

## 2021-09-17 NOTE — DISCHARGE SUMMARY
Physician Discharge Summary     Patient ID:  Aimee Guaman  [id number]  57 y.o.  1955    Admit date: 9/16/2021    Discharge date and time: 9/17/2021  3:20 PM     Admitting Physician: Kiara Miller MD     Discharge Physician: Joe Caraballo    Admission Diagnoses: Cervical spondylosis with myelopathy [M47.12]    Discharge Diagnoses: cervical spondylosis with myelopathy                                          Asymptomatic tachycardia                                           Hypothyroid                                           Reactive leukocytosis                                           HTN    Admission Condition: good    Discharged Condition: good    Indication for Admission: cervical myelopathy    Hospital Course:  78 y.o. male with significant past medical history of CAD, HTN, hypothyroid , neck pain which radiated to right arm with numbness. He underwent C5-C6 and C6-C7 ACDF. He was seen in same day surgery. Noted he had some tachycardia but asymptomatic with no shortness of breath or chest pain. He was admitted for cervical spondylossis with myelopathy and had C5-C6 and C6-C7 ACDF. He was cleared by neurosurgery. Had reactive leukocytosis and will follow up with surgery and recommend repeat CBC. He did have mild tachycardia 100-110 and asymptomatic. TSH and CXR neg. EKG with no ST with no other arrythmia and suspect post procedural due to post op pain. No signs of infection. He has leukocytosis at 16 WBC and consistent with reactive from his procedure and recommend repeat CBC when he follows up with neurosurgery. He was placed on metoprolol for his uncontrolled HTN and mild tachycardia and will follow up with his PCP.    Consults: neurosur surgery      Outstanding Order Results     Date and Time Order Name Status Description    9/17/2021 10:39 AM EKG 12 Lead Preliminary             Vitals:    09/17/21 1311   BP: (!) 144/93   Pulse:    Resp: 20   Temp: 97.4 °F (36.3 °C)   SpO2: 95%   Pulse was 108    Discharge Exam:  CONSTITUTIONAL:  awake  EYES:  lids and lashes normal  ENT:  normocepalic, without obvious abnormality  NECK:  Neck collar and PIYUSH drain out,. Surgical incision site clean with no drainage  LUNGS:  No increased work of breathing, good air exchange, clear to auscultation bilaterally, no crackles or wheezing  CARDIOVASCULAR:  mild tachycardia  ABDOMEN:  No scars, normal bowel sounds, soft, non-distended, non-tender, no masses palpated, no hepatosplenomegally  MUSCULOSKELETAL:  full range of motion noted  NEUROLOGIC:  Awake, alert, oriented to name, place and time. Cranial nerves II-XII are grossly intact. Motor is 5 out of 5 bilaterally. SKIN:  no bruising      Disposition: home    Patient Instructions:      Medication List      START taking these medications    bisacodyl 5 MG EC tablet  Commonly known as: DULCOLAX  Take 1 tablet by mouth daily  Start taking on: September 18, 2021     HYDROcodone-acetaminophen 5-325 MG per tablet  Commonly known as: NORCO  Take 1-2 tablets by mouth every 4 hours as needed for Pain for up to 7 days.      methocarbamol 500 MG tablet  Commonly known as: Robaxin  Take 1 tablet by mouth 3 times daily for 10 days     metoprolol tartrate 25 MG tablet  Commonly known as: LOPRESSOR  Take 0.5 tablets by mouth 2 times daily Hold for HR < 65 or SBP < 140        CONTINUE taking these medications    atorvastatin 20 MG tablet  Commonly known as: LIPITOR     lisinopril 10 MG tablet  Commonly known as: PRINIVIL;ZESTRIL     nitroGLYCERIN 0.4 MG SL tablet  Commonly known as: NITROSTAT  Place 1 tablet under the tongue every 5 minutes as needed for Chest pain     Synthroid 137 MCG tablet  Generic drug: levothyroxine        STOP taking these medications    aspirin EC 81 MG EC tablet           Where to Get Your Medications      These medications were sent to [address] [phone]  [address] [place] [address] [address], [address] [place] [address]    Phone:

## 2021-09-17 NOTE — PROGRESS NOTES
1606-P/S sent to Dr Alexa Chase for lopressor script to be phoned in to [facility] in [place]. Phone call to Patients and his spouse to educate on how to take new medication both verbalized understanding.

## 2021-09-17 NOTE — PROGRESS NOTES
PIYUSH drain removed. Patient can discharge after being evaluated by hospitalist team for new tachycardia.

## 2021-09-17 NOTE — PROGRESS NOTES
Patient arrived to surgery overflow area by PACU nurses, Report received from [facility]. Patient awake and responsive to verbal stimuli. He states that he is having no pain and feels wonderful. Anterior neck incision glue dressing intact and without drainage. Patient able to move all extremities without difficulty. C-spine and L-spine assessment completed see flow sheet. Spouse brought back to room to visit with patient for a short period of time because visitation hours have ended. Unit number given to spouse.

## 2021-09-17 NOTE — PROGRESS NOTES
Neurosurgery Post-op Note    PROCEDURE: C5-7 ACDF    9/16/2021 10:28 PM    Patient seen in PACU  Alert and oriented to self, follows commands  Able to move all extremities at baseline, stronger handgrip in right   Drain with minimal output  Incision intact, dermabond in place    BP (!) 147/86   Pulse 85   Temp 97.4 °F (36.3 °C) (Temporal)   Resp 16   Ht 5' 8\" (1.727 m)   Wt 228 lb (103.4 kg)   SpO2 97%   BMI 34.67 kg/m²       Plan:  Admit for observation and pain control  Neuro checks every 4 hours  Monitor drain output  Activity as tolerated, hard cervical collar when upright and out of bed  Advance diet as tolerated  Post-operative xrays ordered  Please call our service if there are any changes in neuro exam    Electronically signed by Giselle Huber PA-C on 9/16/2021 at 10:28 PM

## 2021-09-17 NOTE — H&P
Department of Internal Medicine  Hospitalist  Attending History and Physical      CHIEF COMPLAINT:  Neck pain    Reason for Admission:  Cervical myelopathy    History Obtained From:  patient, electronic medical record    HISTORY OF PRESENT ILLNESS:      The patient is a 78 y.o. male with significant past medical history of CAD, HTN, hypothyroid , neck pain which radiated to right arm with numbness. He underwent C5-C6 and C6-C7 ACDF. He was seen in same day surgery. Noted he had some tachycardia but asymptomatic with no shortness of breath or chest pain. Past Medical History:        Diagnosis Date    Arthritis     CAD (coronary artery disease) 06/11/2012    s/p lima-lad svg-cx    H/O 24 hour EKG monitoring 06/11/2012    predominant sinus rhythm infrequent ventricular and supraventricular ectopy noted    H/O cardiac catheterization 06/16/2012    LM distal 70 LAD no disease CX no disease RCA dominant no disease LV ef 55    H/O cardiovascular stress test 7/2/19,6/12, 12/12/13    Normal Study negative for ischemia. Normal LV size and systolic function. Ejection fraction is 56 %. Exercise tolerance is fair for age as patient exercised for 4 minutes on standard Stas protocol. Normal hemodynamic response to exercise    H/O chest x-ray 06/22/2012    stable opacification of the LLL consistent with a small left pleural effusion and atelectasis or pneumonia    H/O Doppler ultrasound 6/16/2012 carotid    mild plaquing in the common carotid arteries and carotid bulb no physiologically significant stenosis is identified by grayscale or duplex techniques in the extracranial carotid systems    H/O echocardiogram 12/31/13, 8/7/2012    Mild left ventr. hypertrophy EF 50-55%    Hashimoto disease     History of cardiac monitoring 06/11/2021    30 days of cardiac monitoring negative for any clinically significant arrhythmias    History of CT scan of brain 06/11/2012    no acute intracranial abnormality identified  no significant interval change compared to prior studyt    History of CT scan of chest 06/11/2012    multiple subcentimeter bilateral pulmonary nodules indeterminate for benign nodues     Hyperlipidemia     Hypothyroidism 06/11/2012    Left shoulder pain 06/25/2019    ? angina    Obesity (BMI 30.0-34. 9)     Osteoarthritis of right knee 02/26/2018    Prolonged emergence from general anesthesia     S/P CABG x 2 06/2012    s/p CABG x 2 lima-lad svg-cx 6/2012    TIA     times three    TIA (transient ischemic attack) 03/2012    seen by Dr Carolyn Chance     Past Surgical History:        Procedure Laterality Date    APPENDECTOMY     [facility] CORONARY ARTERY BYPASS GRAFT  6/18/2012    lima-lad svg-cx    INGUINAL HERNIA REPAIR Right 07/14/2016    with On-Q pain ball     IMedications Prior to Admission:    No current facility-administered medications on file prior to encounter. Current Outpatient Medications on File Prior to Encounter   Medication Sig Dispense Refill    SYNTHROID 137 MCG tablet TAKE 1 TABLET BY MOUTH EVERY DAY  5    atorvastatin (LIPITOR) 20 MG tablet Take 20 mg by mouth nightly      nitroGLYCERIN (NITROSTAT) 0.4 MG SL tablet Place 1 tablet under the tongue every 5 minutes as needed for Chest pain 25 tablet 3    lisinopril (PRINIVIL;ZESTRIL) 10 MG tablet Take 10 mg by mouth daily      aspirin EC 81 MG EC tablet Take 1 tablet by mouth daily.  (Patient taking differently: Take 81 mg by mouth nightly ) 300 tablet 1         Allergies:  Clopidogrel and Simvastatin    Social History:   Social History     Socioeconomic History    Marital status:      Spouse name: Not on file    Number of children: 1    Years of education: Not on file    Highest education level: Not on file   Occupational History    Occupation:    Tobacco Use    Smoking status: Former Smoker     Packs/day: 1.00     Years: 43.00     Pack years: 43.00 Quit date: 2010     Years since quittin.1    Smokeless tobacco: Never Used   Vaping Use    Vaping Use: Never used   Substance and Sexual Activity    Alcohol use: Yes     Comment:  occasionally has 1 beer    Drug use: No    Sexual activity: Not on file   Other Topics Concern    Not on file   Social History Narrative    Not on file     Social Determinants of Health     Financial Resource Strain:     Difficulty of Paying Living Expenses:    Food Insecurity:     Worried About Running Out of Food in the Last Year:     Ran Out of Food in the Last Year:    Transportation Needs:     Lack of Transportation (Medical):  Lack of Transportation (Non-Medical):    Physical Activity:     Days of Exercise per Week:     Minutes of Exercise per Session:    Stress:     Feeling of Stress :    Social Connections:     Frequency of Communication with Friends and Family:     Frequency of Social Gatherings with Friends and Family:     Attends Mandaeism Services:     Active Member of Clubs or Organizations:     Attends Club or Organization Meetings:     Marital Status:    Intimate Partner Violence:     Fear of Current or Ex-Partner:     Emotionally Abused:     Physically Abused:     Sexually Abused:          Family History:   Family History   Problem Relation Age of Onset    High Cholesterol Sister     Diabetes Sister     Stroke Brother     Heart Disease Mother     Cancer Father     Stroke Brother     Cancer Sister        REVIEW OF SYSTEMS:  CONSTITUTIONAL:  negative  EYES:  negative  HEENT:  Some post op pain but mild   RESPIRATORY:  negative  CARDIOVASCULAR:  negative  GASTROINTESTINAL:  negative  ENDOCRINE:  negative  MUSCULOSKELETAL:  positive for  arthralgias  NEUROLOGICAL:  negative  BEHAVIOR/PSYCH:  negative  PHYSICAL EXAM:  Results for Karis Gonzalez (MRN [id number]) as of 2021 09:18   Ref.  Range 2021 12:00   Sodium Latest Ref Range: 135 - 145 MMOL/L 139   Potassium Latest Ref Range: 3.5 - 5.1 MMOL/L 4.6   Chloride Latest Ref Range: 99 - 110 mMol/L 103   CO2 Latest Ref Range: 21 - 32 MMOL/L 26   BUN Latest Ref Range: 6 - 23 MG/DL 13   Creatinine Latest Ref Range: 0.9 - 1.3 MG/DL 0.9   Anion Gap Latest Ref Range: 4 - 16  10   GFR Non- Latest Ref Range: >60 mL/min/1.73m2 >60   GFR African American Latest Ref Range: >60 mL/min/1.73m2 >60   Glucose Latest Ref Range: 70 - 99 MG/DL 87   Calcium Latest Ref Range: 8.3 - 10.6 MG/DL 9.5   Results for Meryl Terry (MRN [id number]) as of 9/17/2021 09:18   Ref. Range 9/8/2021 12:00   WBC Latest Ref Range: 4.0 - 10.5 K/CU MM 8.8   RBC Latest Ref Range: 4.6 - 6.2 M/CU MM 4.70   Hemoglobin Quant Latest Ref Range: 13.5 - 18.0 GM/DL 14.2   Hematocrit Latest Ref Range: 42 - 52 % 43.2   MCV Latest Ref Range: 78 - 100 FL 91.9   MCH Latest Ref Range: 27 - 31 PG 30.2   MCHC Latest Ref Range: 32.0 - 36.0 % 32.9   MPV Latest Ref Range: 7.5 - 11.1 FL 9.6   RDW Latest Ref Range: 11.7 - 14.9 % 12.3   Platelet Count Latest Ref Range: 140 - 440 K/CU      Vitals:  BP (!) 144/99   Pulse 110   Temp 97.9 °F (36.6 °C) (Temporal)   Resp 18   Ht 5' 8\" (1.727 m)   Wt 228 lb (103.4 kg)   SpO2 93%   BMI 34.67 kg/m²     CONSTITUTIONAL:  awake  EYES:  lids and lashes normal  ENT:  normocepalic, without obvious abnormality  NECK:  Neck collar and PIYUSH drain out  LUNGS:  No increased work of breathing, good air exchange, clear to auscultation bilaterally, no crackles or wheezing  CARDIOVASCULAR:  normal apical pulses  ABDOMEN:  No scars, normal bowel sounds, soft, non-distended, non-tender, no masses palpated, no hepatosplenomegally  MUSCULOSKELETAL:  full range of motion noted  NEUROLOGIC:  Awake, alert, oriented to name, place and time. Cranial nerves II-XII are grossly intact. Motor is 5 out of 5 bilaterally.     SKIN:  no bruising     DATA:  xary cervical spine  ASSESSMENT AND PLAN:    Cervical spondylosis with myelopathy  -POD #1 with C5-C6 and C6-C7 ACDF PIYUSH drain will be discontinued today  Tachycardia   -echo in june with EF 50%  -asymptomatic   -check EKG and cXR  -TSH  Hypothyroid  -synthorid  Leukocytosis  -reactive due to recent surgery  -no fevers   HTN  -ACE      Neuro surgery cleared for discharge. Has some mild tachycardia and asymptomatic and most likely post procedural. No fevers. If work up neg then anticipate discharge.

## 2021-09-17 NOTE — PROGRESS NOTES
Occupational Therapy    [facility] ACUTE CARE OCCUPATIONAL THERAPY EVALUATION    Ballard [place], 1955, [id number]/[id number], 9/17/2021    Discharge Recommendation: Home with initial 24 hour supervision/assistance    History:  [place]:  The encounter diagnosis was S/P cervical spinal fusion. Subjective:  Patient states: \"My  in my right hand has really improved! \"   Pain: Pt stated \"discomfort\" in neck down through R shoulder   Communication with other providers: PT Sandra Troncosoegel   Restrictions: General Precautions, Fall Risk, IV, Telemetry, BP cuff, Pulse Ox, C-Collar, Spinal (c-spine) precautions     Home Setup/Prior level of function:  Social/Functional History  Lives With: Spouse  Type of Home: House  Home Layout: Two level, Able to Live on Main level with bedroom/bathroom (bedroom is upstairs)  Home Access: Stairs to enter with rails  Entrance Stairs - Number of Steps: 2  Entrance Stairs - Rails: Right  Bathroom Shower/Tub: Tub/Shower unit, Shower chair with back  Bathroom Toilet: Handicap height  Bathroom Equipment: Tub transfer bench, Commode  Home Equipment: Rolling walker, Cane  ADL Assistance: Independent  Homemaking Assistance: Independent  Homemaking Responsibilities: Yes  Ambulation Assistance: Independent  Transfer Assistance: Independent  Active : Yes  Occupation: Retired  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  All other vitals stable throughout session     Body Systems and functions:  · ROM: WFL BL UEs  · Strength: 4-/5 BL UEs across all major muscle groups    · Sensation: WFL  · Tone: Normal  · Coordination: WFL  · Perception: WNL    Activities of Daily Living (ADLs):  · Feeding: Independent   · Grooming: SBA, Pt performed hand washing in standing at sink  · UB bathing: CGA, seated for safety    · LB bathing: CGA, seated for safety    · UB dressing: SBA  · LB dressing: CGA  · Toileting: CGA, Pt urinated in regular toilet in standing     Cognitive and Psychosocial Functioning:  · Overall cognitive status: WNL  · Affect: Normal      Balance:   · Sitting: Supervision seated unsupported at EOB  · Standing: CGA with RW initially and then CGA without progressing to SBA     Functional Mobility:  · Bed Mobility: Supervision sit to supine with sequencing and cues for log roll technique, Supervision supine to sit EOB  · Transfers: CGA sit to stand from bed, cues for safe hand placement, CGA stand to sit on bed   · Ambulation: CGA 75ft with RW, CGA additional 150ft without AD, see PT note for further assessment       AM-PAC 6 click short form for inpatient daily activity:   How much help from another person does the patient currently need. .. Unable  Dep A Lot  Max A A Lot   Mod A A Little  Min A A Little   CGA  SBA None   Mod I  Indep  Sup   1. Putting on and taking off regular lower body clothing? [] 1    [] 2   [] 2   [] 3   [x] 3   [] 4      2. Bathing (including washing, rinsing, drying)? [] 1   [] 2   [] 2 [] 3 [x] 3 [] 4   3. Toileting, which includes using toilet, bedpan, or urinal? [] 1    [] 2   [] 2   [] 3   [x] 3   [] 4     4. Putting on and taking off regular upper body clothing? [] 1   [] 2   [] 2   [] 3   [x] 3    [] 4      5. Taking care of personal grooming such as brushing teeth? [] 1   [] 2    [] 2 [] 3    [] 3   [x] 4      6. Eating meals? [] 1   [] 2   [] 2   [] 3   [] 3   [x] 4      Raw Score:  20    [24=0% impaired(CH), 23=1-19%(CI), 20-22=20-39%(CJ), 15-19=40-59%(CK), 10-14=60-79%(CL), 7-9=80-99%(CM), 6=100%(CN)]     Treatment:  Therapeutic Activity Training:   Therapeutic activity training was instructed today. Cues were given for safety, sequence, UE/LE placement, awareness, and balance.     Activities performed today included bed mobility training, sup-sit, sit-stand, SPT, education on spinal precautions, importance of C-collar and log roll technique for bed mobility   Self Care Training:   Cues were given for safety, sequence, UE/LE placement, visual cues, and balance. Activities performed today included toileting and hand hygiene. Safety Measures: Gait belt used, Left in bed, needs in reach     Assessment:  Pt is a 78year old male with a  has a past medical history of Arthritis, CAD (coronary artery disease), H/O 24 hour EKG monitoring, H/O cardiac catheterization, H/O cardiovascular stress test, H/O chest x-ray, H/O Doppler ultrasound, H/O echocardiogram, Hashimoto disease, History of cardiac monitoring, History of CT scan of brain, History of CT scan of chest, Hyperlipidemia, Hypothyroidism, Left shoulder pain, Obesity (BMI 30.0-34.9), Osteoarthritis of right knee, Prolonged emergence from general anesthesia, S/P CABG x 2, TIA, and TIA (transient ischemic attack). Pt admitted for and s/p Anterior C5-6 and C6-7 discectomy and arthrodesis on 9/16. Pt is from home where he lives in a 1 story home (able to stay on 1st floor) with his wife.  Pt is independent with all ADLs and IADLs at baseline, Pt appears to be functioning near his baseline and is safe from a self care and mobility standpoint to return home with initial 24/7 supervision/assistance     Complexity: Moderate  Prognosis: Good, Fair  Plan: D/C Home with initial 24/7 supervision       Time:   Time in: 1117  Time out: 1155  Timed treatment minutes: 23  Total time: 38      Electronically signed by:      MU Mckeon/L, [address] [facility] JAIRO [id number]

## 2021-09-20 ENCOUNTER — TELEPHONE (OUTPATIENT)
Dept: NEUROSURGERY | Age: 66
End: 2021-09-20

## 2021-09-20 ENCOUNTER — TELEPHONE (OUTPATIENT)
Dept: CARDIOLOGY CLINIC | Age: 66
End: 2021-09-20

## 2021-09-20 NOTE — TELEPHONE ENCOUNTER
Patient was in the hospital 9/16/2021 for scheduled procedure of Cervical spondylosis with myelopathy. Patients wife states during procedure patient had an \"episode\" of elevated heart rate and patient was discharged with lopressor 25 mg sig:Take 0.5 tablets by mouth 2 times daily Hold for HR < 65 or SBP < 140 - Oral. She states she is hesitant for patient to take this and would like to know what Dr Joana Storm recommends. States patient has been checking BP and HR twice daily and it seems to be running okay with no episodes of elevated HR since discharge so patient has not taken medication, but they would just like Dr Fabio Horn input. Patient did wear event monitor a few months ago but was advised it was normal. Patient is schedule for follow up with Dr Joana Storm 1/2022. Should patient take lopressor if needed and also wanting to know if follow up should be rescheduled to sooner? Please Advise. Reports last BP check reading was 117/84 and pulse has been under 100, with highest reading at 99. Problem: Patient Care Overview (Adult)  Goal: Plan of Care Review  Outcome: Ongoing (interventions implemented as appropriate)    07/19/17 1840   Coping/Psychosocial Response Interventions   Plan Of Care Reviewed With daughter;son  (Son José Miguel, daughters Cydney and loida, Renita is on her way)   Patient Care Overview   Progress declining   Outcome Evaluation   Outcome Summary/Follow up Plan New Palliative Consult for GOC, José Miguel, Cydney and Loida want full comfort care, no feeding tubes, no ivf's, no cpr, no intubation or life support, they want to make sure we address her symptoms and keep her comfortable.

## 2021-09-20 NOTE — TELEPHONE ENCOUNTER
Chart is reviewed. Okay to hold off on Lopressor but have a 48-hour Holter monitor done and follow-up for further recommendations.

## 2021-09-20 NOTE — TELEPHONE ENCOUNTER
Patient states that he is doing so far so good and has no complaints. He is going to call and follow up with his cardiologist and primary care doctor today. Patient advised to call with and questions or concerns.

## 2021-09-21 ENCOUNTER — NURSE ONLY (OUTPATIENT)
Dept: CARDIOLOGY CLINIC | Age: 66
End: 2021-09-21
Payer: MEDICARE

## 2021-09-21 DIAGNOSIS — R00.0 TACHYCARDIA: Primary | ICD-10-CM

## 2021-09-21 PROCEDURE — 93225 XTRNL ECG REC<48 HRS REC: CPT | Performed by: INTERNAL MEDICINE

## 2021-09-21 NOTE — PATIENT INSTRUCTIONS
48 hour holter monitor applied [email or web address] for Tachycardia Serial # [id number] . Instructed patient on monitor and proper use. Instructed on diary. When to remove and bring it back. Must leave the holter monitor on  without removing for the duration of time ordered. Answered all questions the patient had. [phone] with any questions or concerns with the monitor.

## 2021-09-27 NOTE — ANESTHESIA POSTPROCEDURE EVALUATION
Department of Anesthesiology  Postprocedure Note    Patient: Harish Burns  MRN: [id number]  [place]: 1955  Date of evaluation: 9/27/2021  Time:  12:33 PM     Procedure Summary     Date: 09/16/21 Room / Location: [address] [place] [address]    Anesthesia Start: 2015 Anesthesia Stop: 2234    Procedure: C5-6 AND C6-7 ANTERIOR CERVICAL DISCECTOMY AND FUSION, C5-6 AND C6-7 INTERBODY DEVICES, MORSELIZED ALLOGRAFT, ANTERIOR C5-7 INSTRUMENTATION WITH NEURO MONITORING/TIVA (N/A Spine Cervical) Diagnosis:       Cervical spondylosis with myelopathy      (Cervical spondylosis with myelopathy [M47.12])    Surgeons: Kelly Plunkett MD Responsible Provider: Selam Ross MD    Anesthesia Type: general ASA Status: 3          Anesthesia Type: general    Bruno Phase I: Bruno Score: 9    Bruno Phase II:      Last vitals: Reviewed and per EMR flowsheets.        Anesthesia Post Evaluation    Patient location during evaluation: PACU  Patient participation: complete - patient participated  Level of consciousness: awake and alert  Pain score: 3  Airway patency: patent  Nausea & Vomiting: no nausea and no vomiting  Complications: no  Cardiovascular status: blood pressure returned to baseline  Respiratory status: acceptable  Hydration status: euvolemic

## 2021-10-01 PROCEDURE — 93227 XTRNL ECG REC<48 HR R&I: CPT | Performed by: INTERNAL MEDICINE

## 2021-10-05 ENCOUNTER — TELEPHONE (OUTPATIENT)
Dept: CARDIOLOGY CLINIC | Age: 66
End: 2021-10-05

## 2021-10-05 NOTE — TELEPHONE ENCOUNTER
Holter Monitor Report:    Normal study negative for any clinically significant cardiac arrhythmias. Pt notified of test results and; pt voiced understanding.

## 2021-10-29 ENCOUNTER — HOSPITAL ENCOUNTER (OUTPATIENT)
Age: 66
Discharge: HOME | End: 2021-10-29
Payer: MEDICARE

## 2021-10-29 ENCOUNTER — HOSPITAL ENCOUNTER (OUTPATIENT)
Dept: GENERAL RADIOLOGY | Age: 66
Discharge: HOME | End: 2021-10-29
Payer: MEDICARE

## 2021-10-29 DIAGNOSIS — Z98.1 S/P CERVICAL SPINAL FUSION: ICD-10-CM

## 2021-10-29 PROCEDURE — 72040 X-RAY EXAM NECK SPINE 2-3 VW: CPT

## 2021-12-06 RX ORDER — ATORVASTATIN CALCIUM 20 MG/1
20 TABLET, FILM COATED ORAL DAILY
Qty: 90 TABLET | Refills: 1 | Status: SHIPPED | OUTPATIENT
Start: 2021-12-06 | End: 2022-01-31

## 2021-12-06 RX ORDER — ATORVASTATIN CALCIUM 20 MG/1
20 TABLET, FILM COATED ORAL DAILY
Qty: 90 TABLET | Refills: 1 | OUTPATIENT
Start: 2021-12-06

## 2021-12-10 ENCOUNTER — HOSPITAL ENCOUNTER (OUTPATIENT)
Age: 66
Discharge: HOME | End: 2021-12-10
Payer: MEDICARE

## 2021-12-10 ENCOUNTER — HOSPITAL ENCOUNTER (OUTPATIENT)
Dept: GENERAL RADIOLOGY | Age: 66
Discharge: HOME | End: 2021-12-10
Payer: MEDICARE

## 2021-12-10 DIAGNOSIS — Z98.1 POSTSURGICAL ARTHRODESIS STATUS: ICD-10-CM

## 2021-12-10 PROCEDURE — 72050 X-RAY EXAM NECK SPINE 4/5VWS: CPT

## 2022-01-31 ENCOUNTER — OFFICE VISIT (OUTPATIENT)
Dept: CARDIOLOGY CLINIC | Age: 67
End: 2022-01-31
Payer: MEDICARE

## 2022-01-31 VITALS
WEIGHT: 227 LBS | SYSTOLIC BLOOD PRESSURE: 134 MMHG | HEART RATE: 80 BPM | HEIGHT: 68 IN | BODY MASS INDEX: 34.4 KG/M2 | DIASTOLIC BLOOD PRESSURE: 78 MMHG

## 2022-01-31 DIAGNOSIS — E78.00 PURE HYPERCHOLESTEROLEMIA: ICD-10-CM

## 2022-01-31 DIAGNOSIS — Z95.1 S/P CABG X 2: Primary | ICD-10-CM

## 2022-01-31 DIAGNOSIS — G47.33 OSA ON CPAP: ICD-10-CM

## 2022-01-31 DIAGNOSIS — Z99.89 OSA ON CPAP: ICD-10-CM

## 2022-01-31 PROCEDURE — G8484 FLU IMMUNIZE NO ADMIN: HCPCS | Performed by: INTERNAL MEDICINE

## 2022-01-31 PROCEDURE — G8427 DOCREV CUR MEDS BY ELIG CLIN: HCPCS | Performed by: INTERNAL MEDICINE

## 2022-01-31 PROCEDURE — 4040F PNEUMOC VAC/ADMIN/RCVD: CPT | Performed by: INTERNAL MEDICINE

## 2022-01-31 PROCEDURE — G8417 CALC BMI ABV UP PARAM F/U: HCPCS | Performed by: INTERNAL MEDICINE

## 2022-01-31 PROCEDURE — 99214 OFFICE O/P EST MOD 30 MIN: CPT | Performed by: INTERNAL MEDICINE

## 2022-01-31 PROCEDURE — 3017F COLORECTAL CA SCREEN DOC REV: CPT | Performed by: INTERNAL MEDICINE

## 2022-01-31 PROCEDURE — 1123F ACP DISCUSS/DSCN MKR DOCD: CPT | Performed by: INTERNAL MEDICINE

## 2022-01-31 PROCEDURE — 1036F TOBACCO NON-USER: CPT | Performed by: INTERNAL MEDICINE

## 2022-01-31 RX ORDER — ATORVASTATIN CALCIUM 40 MG/1
40 TABLET, FILM COATED ORAL DAILY
Qty: 90 TABLET | Refills: 3 | Status: SHIPPED | OUTPATIENT
Start: 2022-01-31

## 2022-01-31 RX ORDER — ASPIRIN 81 MG/1
81 TABLET ORAL DAILY
COMMUNITY

## 2022-01-31 NOTE — ASSESSMENT & PLAN NOTE
Clinically stable. Continue aggressive risk factor modification and lifestyle changes for secondary prevention.

## 2022-01-31 NOTE — PATIENT INSTRUCTIONS
Continue current cardiovascular medications which have been reviewed and discussed individually with you. Counseled for 30 minutes a day of moderate intensity aerobic exercise like activity. Counseled for calorie counting, reduction in serving size and exercise and lifestyle modification for weight loss. Follow-up in 6 months with EKG, sooner if needed.

## 2022-01-31 NOTE — PROGRESS NOTES
Yannick Gold (:  1955) is a 78 y.o. male,     Chief Complaint   Patient presents with    Coronary Artery Disease     OV for 6 month check. Pt denies any chest pain,SOB,dizziness,swelling to ankles, or palpitations.  6 Month Follow-Up    Hyperlipidemia     Patient is here for follow up for coronary artery disease status post bypass surgery and history of obstructive sleep apnea and hyperlipidemia on Lipitor 40 mg daily. He had cervical spinal surgery done since last visit and the radiculopathy he was having has improved by 85%. He is not exercising due to bad weather and does not have any in-house equipment like treadmill. Denies any chest pains or shortness of breath or palpitations. He has been compliant to his medications. Records are reviewed and discussed and recent labs done by PCP are reviewed in care everywhere and cannot be done compared and discussed with the patient. Allergies   Allergen Reactions    Clopidogrel Anaphylaxis     petechiae    Simvastatin Other (See Comments)     myalagia     Prior to Admission medications    Medication Sig Start Date End Date Taking? Authorizing Provider   aspirin 81 MG EC tablet Take 81 mg by mouth daily   Yes Historical Provider, MD   atorvastatin (LIPITOR) 40 MG tablet Take 1 tablet by mouth daily 22  Yes Valentina Pandya MD   bisacodyl (DULCOLAX) 5 MG EC tablet Take 1 tablet by mouth daily 21  Yes Anum Dixon PA-C   nitroGLYCERIN (NITROSTAT) 0.4 MG SL tablet Place 1 tablet under the tongue every 5 minutes as needed for Chest pain 20  Yes Valentina Pandya MD   SYNTHROID 137 MCG tablet TAKE 1 TABLET BY MOUTH EVERY DAY 19  Yes Historical Provider, MD   lisinopril (PRINIVIL;ZESTRIL) 10 MG tablet Take 10 mg by mouth daily   Yes Historical Provider, MD     Past Medical History:   Diagnosis Date         Normal study negative for any clinically significant cardiac arrhythmias.     Arthritis     CAD (coronary artery disease) 06/11/2012    s/p lima-lad svg-cx    H/O 24 hour EKG monitoring 06/11/2012    predominant sinus rhythm infrequent ventricular and supraventricular ectopy noted    H/O cardiac catheterization 06/16/2012    LM distal 70 LAD no disease CX no disease RCA dominant no disease LV ef 55    H/O cardiovascular stress test 7/2/19,6/12, 12/12/13    Normal Study negative for ischemia. Normal LV size and systolic function. Ejection fraction is 56 %. Exercise tolerance is fair for age as patient exercised for 4 minutes on standard St[facility] protocol. Normal hemodynamic response to exercise    H/O chest x-ray 06/22/2012    stable opacification of the LLL consistent with a small left pleural effusion and atelectasis or pneumonia    H/O Doppler ultrasound 6/16/2012 carotid    mild plaquing in the common carotid arteries and carotid bulb no physiologically significant stenosis is identified by grayscale or duplex techniques in the extracranial carotid systems    H/O echocardiogram 12/31/13, 8/7/2012    Mild left ventr. hypertrophy EF 50-55%    Hashimoto disease     History of cardiac monitoring 06/11/2021    30 days of cardiac monitoring negative for any clinically significant arrhythmias    History of CT scan of brain 06/11/2012    no acute intracranial abnormality identified  no significant interval change compared to prior studyt    History of CT scan of chest 06/11/2012    multiple subcentimeter bilateral pulmonary nodules indeterminate for benign nodues     Hyperlipidemia     Hypothyroidism 06/11/2012    Left shoulder pain 06/25/2019    ? angina    Obesity (BMI 30.0-34. 9)     Osteoarthritis of right knee 02/26/2018    Prolonged emergence from general anesthesia     S/P CABG x 2 06/2012    s/p CABG x 2 lima-lad svg-cx 6/2012    Seizure ([address]     TIA     times three    TIA (transient ischemic attack) 03/2012    seen by Dr Caitlyn Murillo:    01/31/22 1028   BP: 134/78   Pulse: 80 Weight: 227 lb (103 kg)   Height: 5' 8\" (1.727 m)      Body mass index is 34.52 kg/m². Wt Readings from Last 3 Encounters:   01/31/22 227 lb (103 kg)   09/16/21 228 lb (103.4 kg)   09/08/21 228 lb (103.4 kg)     Constitutional:  Patient is moderately overweight male in no apparent distress. HEENT: Anterior cervical spinal surgery incision has healed well. He is wearing glasses and facemask  Cardiovascular: Auscultation: Normal S1 and S2. No murmurs or gallops noted. Carotids are negative for bruits. Respiratory:  Respiratory effort is normal. Breath sounds are clear to auscultation. Extremities:  No edema, clubbing,  Cyanosis, petechiae. Abdomen:  No masses or tenderness. No organomegaly noted. Neurologic:  Oriented to time, place, and person and non-anxious. No focal neurological deficit noted. Psychiatric: Normal mood and effect. Most recent cervical spinal x-ray done on 12/10/2021 reported  1. Anterior cervical discectomy and fusion of C5-C7.  Hardware appears intact   and well seated. 2. Vertebral body heights appear preserved without evidence of significant   spondylolisthesis or instability. 3. Multilevel spondylosis, worse at C4-C5. Pertinent records reviewed and discussed with patient and results are as follow:  Recent labs are reviewed in care everywhere from PCP done on January 26, 2022. TSH was 0.75 and PSA was 0.22  Cholesterol 127, triglycerides 159, HDL 36 and LDL 59  LFTs were normal electrolytes were normal.  BUN 15 creatinine 0.86 glucose 111  Hemoglobin 14.9 hematocrit 45.3 platelet count of 177,029 and white count was 10,300.     Lab Results   Component Value Date    WBC 16.0 09/17/2021    HGB 13.5 09/17/2021    HCT 40.6 09/17/2021     09/17/2021     Lab Results   Component Value Date    CHOL 155 06/08/2021    TRIG 208 (H) 06/08/2021    HDL 36 (L) 06/08/2021    LDLCALC 82 09/22/2020    LDLDIRECT 101 (H) 06/08/2021     Lab Results   Component Value Date    BUN 14 09/17/2021    CREATININE 0.7 09/17/2021     09/17/2021    K 4.3 09/17/2021     Lab Results   Component Value Date    INR 0.98 09/08/2021     Lab Results   Component Value Date    LABA1C 6.2 09/08/2021     ASSESSMENT/PLAN:    1. S/P CABG x 2, lima-lad svg-cx 6/2012  Assessment & Plan:  Clinically stable. Continue aggressive risk factor modification and lifestyle changes for secondary prevention. 2. ASMITA on CPAP  Assessment & Plan:  Not on CPAP anymore. Continue weight management and regular exercises. 3. Pure hypercholesterolemia  Assessment & Plan:  Lipid results from January 26, 2022 show improvement compared to June 2021. LDL has come down from 82 last year to 59 this year and HDL has remained unchanged. Continue Lipitor 40 mg daily. Continue current cardiovascular medications which have been reviewed and discussed individually with you. Counseled for 30 minutes a day of moderate intensity aerobic exercise like activity. Counseled for calorie counting, reduction in serving size and exercise and lifestyle modification for weight loss. Follow-up in 6 months with EKG, sooner if needed. An electronic signature was used to authenticate this note.     --Jax Drummond MD

## 2022-02-11 ENCOUNTER — HOSPITAL ENCOUNTER (OUTPATIENT)
Age: 67
Discharge: HOME | End: 2022-02-11
Payer: MEDICARE

## 2022-02-11 ENCOUNTER — HOSPITAL ENCOUNTER (OUTPATIENT)
Dept: GENERAL RADIOLOGY | Age: 67
Discharge: HOME | End: 2022-02-11
Payer: MEDICARE

## 2022-02-11 DIAGNOSIS — Z98.1 STATUS POST CERVICAL ARTHRODESIS: Primary | ICD-10-CM

## 2022-02-11 DIAGNOSIS — Z98.1 STATUS POST CERVICAL ARTHRODESIS: ICD-10-CM

## 2022-02-11 PROCEDURE — 72050 X-RAY EXAM NECK SPINE 4/5VWS: CPT

## 2022-08-01 ENCOUNTER — OFFICE VISIT (OUTPATIENT)
Dept: CARDIOLOGY CLINIC | Age: 67
End: 2022-08-01
Payer: MEDICARE

## 2022-08-01 VITALS
HEIGHT: 68 IN | HEART RATE: 69 BPM | SYSTOLIC BLOOD PRESSURE: 124 MMHG | BODY MASS INDEX: 33.49 KG/M2 | WEIGHT: 221 LBS | DIASTOLIC BLOOD PRESSURE: 76 MMHG

## 2022-08-01 DIAGNOSIS — E78.00 PURE HYPERCHOLESTEROLEMIA: ICD-10-CM

## 2022-08-01 DIAGNOSIS — R06.02 SOB (SHORTNESS OF BREATH): Primary | ICD-10-CM

## 2022-08-01 DIAGNOSIS — Z95.1 S/P CABG X 2: ICD-10-CM

## 2022-08-01 DIAGNOSIS — G47.33 OSA ON CPAP: ICD-10-CM

## 2022-08-01 DIAGNOSIS — Z99.89 OSA ON CPAP: ICD-10-CM

## 2022-08-01 PROCEDURE — 93000 ELECTROCARDIOGRAM COMPLETE: CPT | Performed by: INTERNAL MEDICINE

## 2022-08-01 PROCEDURE — 99214 OFFICE O/P EST MOD 30 MIN: CPT | Performed by: INTERNAL MEDICINE

## 2022-08-01 PROCEDURE — 1123F ACP DISCUSS/DSCN MKR DOCD: CPT | Performed by: INTERNAL MEDICINE

## 2022-08-01 NOTE — ASSESSMENT & PLAN NOTE
Clinically stable. Had a nuclear stress test negative for ischemia last year. Anticipating colonoscopy for screening purposes and it is okay for him to hold aspirin if needed for 5 to 7 days for this procedure and he is considered low cardiac risk from colonoscopy.

## 2022-08-01 NOTE — PATIENT INSTRUCTIONS
Ok to hold Aspirin if needed for colonoscopy. He is considered low cardiac risk from the procedure. Continue current cardiovascular medications which have been reviewed and discussed individually with you. Primary/secondary prevention is the goal by aggressive risk modification, healthy and therapeutic life style changes for cardiovascular risk reduction. Various goals are discussed and questions answered. Appropriate prescriptions if needed on this visit are addressed. After visit summery is provided. Questions answered and patient verbalizes understanding. Follow up in 6 months,  sooner if needed.

## 2022-08-01 NOTE — PROGRESS NOTES
Coreen Munguia  1955  [place]      Chief Complaint   Patient presents with    Coronary Artery Disease    Hyperlipidemia     OV for 6 month check. Pt denies any chest pain, or palpitations. He does have some SOB,dizziness, and swelling to ankles. Chief complaint and HPI:  Coreen Munguia  is a 75 y.o. male following up for premature coronary artery disease status post bypass surgery in 2012 and has hyper lipidemia and obstructive sleep apnea. He denies any cardiac symptoms. He stays very active. He is retired. He does not do any regular exercises but works hard and does yard work and it resulted in profuse sweating but no chest pains or shortness of breath. He is compliant with his medications. Most recent labs are reviewed in care everywhere from PCP. Is anticipating screening colonoscopy soon. Rest of the Cardiovascular system review is otherwise unchanged from prior encounter. Past medical history:  has a past medical history of [id number], Arthritis, CAD (coronary artery disease), H/O 24 hour EKG monitoring, H/O cardiac catheterization, H/O cardiovascular stress test, H/O chest x-ray, H/O Doppler ultrasound, H/O echocardiogram, Hashimoto disease, History of cardiac monitoring, History of CT scan of brain, History of CT scan of chest, Hyperlipidemia, Hypothyroidism, Left shoulder pain, Obesity (BMI 30.0-34.9), Osteoarthritis of right knee, Prolonged emergence from general anesthesia, S/P CABG x 2, Seizure ([address] TIA, and TIA (transient ischemic attack). Past surgical history:  has a past surgical history that includes Appendectomy; Cholecystectomy; Coronary artery bypass graft (6/18/2012); Inguinal hernia repair (Right, 07/14/2016); Cardiac surgery; Cardiac catheterization; and cervical fusion (N/A, 9/16/2021).   Social History:   Social History     Tobacco Use    Smoking status: Former     Packs/day: 1.00     Years: 43.00     Pack years: 43.00     Types: Cigarettes     Quit date: 2010     Years since quittin.9    Smokeless tobacco: Never   Substance Use Topics    Alcohol use: Yes     Comment:  occasionally has 1 beer     Family history: family history includes Cancer in his father and sister; Diabetes in his sister; Heart Disease in his mother; High Cholesterol in his sister; Stroke in his brother and brother. ALLERGIES:  Clopidogrel and Simvastatin  Prior to Admission medications    Medication Sig Start Date End Date Taking? Authorizing Provider   aspirin 81 MG EC tablet Take 81 mg by mouth daily   Yes Historical Provider, MD   atorvastatin (LIPITOR) 40 MG tablet Take 1 tablet by mouth daily 22  Yes Jannet Kaur MD   bisacodyl (DULCOLAX) 5 MG EC tablet Take 1 tablet by mouth daily 21  Yes Slava Odell PA-C   nitroGLYCERIN (NITROSTAT) 0.4 MG SL tablet Place 1 tablet under the tongue every 5 minutes as needed for Chest pain 20  Yes Jannet Kaur MD   SYNTHROID 137 MCG tablet TAKE 1 TABLET BY MOUTH EVERY DAY 19  Yes Historical Provider, MD   lisinopril (PRINIVIL;ZESTRIL) 10 MG tablet Take 10 mg by mouth daily   Yes Historical Provider, MD     Vitals:    22 1023   BP: 124/76   Pulse: 69   Weight: 221 lb (100.2 kg)   Height: 5' 8\" (1.727 m)      Body mass index is 33.6 kg/m². Wt Readings from Last 3 Encounters:   22 221 lb (100.2 kg)   22 227 lb (103 kg)   21 228 lb (103.4 kg)     Constitutional:  Patient is moderately overweight male in no apparent distress. Lost 6 pounds since 2022  HEENT: Anterior cervical spinal surgery incision has healed well. He is wearing glasses and facemask  Cardiovascular: Auscultation: Normal S1 and S2. No murmurs or gallops noted. Carotids are negative for bruits. Respiratory:  Respiratory effort is normal. Breath sounds are clear to auscultation. Extremities:  No edema, clubbing,  Cyanosis, petechiae. Abdomen:  No masses or tenderness. No organomegaly noted.   Neurologic: Oriented to time, place, and person and non-anxious. No focal neurological deficit noted. Psychiatric: Normal mood and effect. EKG today is consistent with normal sinus rhythm 69 bpm essentially normal EKG. LAB REVIEW:  Lipids:   Component 06/28/22 01/26/22 07/16/21 02/08/21   TRIGLYCERIDE 161 159 116 162 High    CHOLESTEROL 131  127  115  152    HDL CHOLESTEROL 37  36  32  34    LDL CHOLESTEROL, CALCULATED 62  59  60  86      Serum chemistry:  Component 06/28/22 01/26/22 09/15/21 07/16/21 02/08/21   SODIUM 139 139 138 141 140   POTASSIUM 4.6 4.5 4.4 4.4 4.6   CHLORIDE 106 102 105 109 106   CARBON DIOXIDE (CO2) 24.0 28.0 24.0 24.0 25.0   GLUCOSE 100 111 High  105 High  99 96   BUN 13 15 12 7 16   CREATININE SERUM 0.91 0.86 0.90 0.80 0.80   CALCIUM 9.6 9.4 9.5 9.0 9.1   PROTEIN, TOTAL 7.3 7.9 7.4 7.3 7.2   Albumin 4.0 -- -- -- --   ALT 52 54 71 62 99 High    ALKALINE PHOSPHATASE 73 84 70 69 65   AST 47 41 59 46 57   BILIRUBIN, TOTAL 0.5 0.6 0.7 0.5 0.6   CBC:  Component 06/28/22 01/26/22 09/15/21 07/16/21 02/08/21   WBC (WHITE BLOOD COUNT) 9.85 10.30 9.57 8.21 7.84   RBC 4.61 4.97 4.71 4.54 4.34   HEMOGLOBIN (HGB) 13.8 14.9 14.3 13.9 Low  13.5 Low    HEMATOCRIT (HCT) 42.0 45.3 42.6 42.0 40.9 Low    MEAN CELL VOLUME 91.1 91.1 90.4 92.5 94.2   Mean Cell HGB 29.9 30.0 30.4 30.6 31.1   MEAN CELL HGB CONCENTRATION 32.9 32.9 33.6 33.1 33.0   PLATELET COUNT 488 377 260 230 243     IMPRESSION and RECOMMENDATIONS:      1. SOB (shortness of breath)  -     EKG 12 lead; Future  2. Pure hypercholesterolemia  Assessment & Plan:  Last LDL reported 62. Continue atorvastatin 40 mg daily. 3. ASMITA on CPAP  Assessment & Plan:  Continue CPAP therapy. 4. S/P CABG x 2, lima-lad svg-cx 6/2012  Assessment & Plan:  Clinically stable. Had a nuclear stress test negative for ischemia last year.   Anticipating colonoscopy for screening purposes and it is okay for him to hold aspirin if needed for 5 to 7 days for this procedure and he is considered low cardiac risk from colonoscopy. Ok to hold Aspirin if needed for colonoscopy. He is considered low cardiac risk from the procedure. Continue current cardiovascular medications which have been reviewed and discussed individually with you. Primary/secondary prevention is the goal by aggressive risk modification, healthy and therapeutic life style changes for cardiovascular risk reduction. Various goals are discussed and questions answered. Appropriate prescriptions if needed on this visit are addressed. After visit summery is provided. Questions answered and patient verbalizes understanding. Follow up in 6 months,  sooner if needed. Griffin Andrade MD, 8/1/2022 10:59 AM     Please note this report has been partially produced using speech recognition software and may contain errors related to that system including errors in grammar, punctuation, and spelling, as well as words and phrases that may be inappropriate. If there are any questions or concerns please feel free to contact the dictating provider for clarification.

## 2022-11-16 ENCOUNTER — HOSPITAL ENCOUNTER (OUTPATIENT)
Dept: CT IMAGING | Age: 67
Discharge: HOME | End: 2022-11-16
Payer: MEDICARE

## 2022-11-16 DIAGNOSIS — R91.1 PULMONARY NODULE SEEN ON IMAGING STUDY: ICD-10-CM

## 2022-11-16 PROCEDURE — 71250 CT THORAX DX C-: CPT

## 2022-12-27 ENCOUNTER — OFFICE VISIT (OUTPATIENT)
Dept: CARDIOLOGY CLINIC | Age: 67
End: 2022-12-27
Payer: MEDICARE

## 2022-12-27 VITALS
BODY MASS INDEX: 34.4 KG/M2 | HEIGHT: 68 IN | RESPIRATION RATE: 16 BRPM | DIASTOLIC BLOOD PRESSURE: 78 MMHG | SYSTOLIC BLOOD PRESSURE: 108 MMHG | WEIGHT: 227 LBS | HEART RATE: 84 BPM

## 2022-12-27 DIAGNOSIS — E78.00 PURE HYPERCHOLESTEROLEMIA: ICD-10-CM

## 2022-12-27 DIAGNOSIS — Z99.89 OSA ON CPAP: ICD-10-CM

## 2022-12-27 DIAGNOSIS — G47.33 OSA ON CPAP: ICD-10-CM

## 2022-12-27 DIAGNOSIS — Z95.1 S/P CABG X 2: Primary | ICD-10-CM

## 2022-12-27 PROCEDURE — 99214 OFFICE O/P EST MOD 30 MIN: CPT | Performed by: INTERNAL MEDICINE

## 2022-12-27 PROCEDURE — G8417 CALC BMI ABV UP PARAM F/U: HCPCS | Performed by: INTERNAL MEDICINE

## 2022-12-27 PROCEDURE — 3017F COLORECTAL CA SCREEN DOC REV: CPT | Performed by: INTERNAL MEDICINE

## 2022-12-27 PROCEDURE — 1123F ACP DISCUSS/DSCN MKR DOCD: CPT | Performed by: INTERNAL MEDICINE

## 2022-12-27 PROCEDURE — G8484 FLU IMMUNIZE NO ADMIN: HCPCS | Performed by: INTERNAL MEDICINE

## 2022-12-27 PROCEDURE — G8427 DOCREV CUR MEDS BY ELIG CLIN: HCPCS | Performed by: INTERNAL MEDICINE

## 2022-12-27 PROCEDURE — 1036F TOBACCO NON-USER: CPT | Performed by: INTERNAL MEDICINE

## 2022-12-27 RX ORDER — FAMOTIDINE 40 MG/1
TABLET, FILM COATED ORAL
COMMUNITY
Start: 2022-11-07

## 2022-12-27 RX ORDER — ATORVASTATIN CALCIUM 40 MG/1
40 TABLET, FILM COATED ORAL DAILY
Qty: 90 TABLET | Refills: 3 | Status: SHIPPED | OUTPATIENT
Start: 2022-12-27

## 2022-12-27 NOTE — PATIENT INSTRUCTIONS
Continue current cardiovascular medications which have been reviewed and discussed individually with you. Counseled for calorie counting, reduction in serving size and exercise and lifestyle modification for weight loss. Counseled for 30 minutes a day of moderate intensity aerobic exercise like activity. Primary/secondary prevention is the goal by aggressive risk modification, healthy and therapeutic life style changes for cardiovascular risk reduction. Various goals are discussed and questions answered. Appropriate prescriptions if needed on this visit are addressed. After visit summery is provided. Questions answered and patient verbalizes understanding. Follow up in 6 months with ECG,  sooner if needed.

## 2022-12-27 NOTE — PROGRESS NOTES
Yamilet Vicente  1955  [place]      Chief Complaint   Patient presents with    Coronary Artery Disease    Hyperlipidemia     Pt denies any new cardiac concerns. Pt is non-smoker. Chief complaint and HPI:  Yamilet Vicente  is a 75 y.o. male following up for known coronary artery disease and hyperlipidemia and obstructive sleep apnea. He denies any cardiac symptoms. He never had any chest pains even before his bypass surgery in 2012. He has had TIAs. For him to have CAD work-up at that time. He is not exercising due to sciatica. He had cervical spinal surgery done last year and is tingling and numbness in arms are Better. He is compliant to his medication. He is does not smoke. Medications are reviewed and reconciled and recent labs are reviewed in care everywhere. Rest of the Cardiovascular system review is otherwise unchanged from prior encounter. Past medical history:  has a past medical history of [id number], Arthritis, CAD (coronary artery disease), H/O 24 hour EKG monitoring, H/O cardiac catheterization, H/O cardiovascular stress test, H/O chest x-ray, H/O Doppler ultrasound, H/O echocardiogram, Hashimoto disease, History of cardiac monitoring, History of CT scan of brain, History of CT scan of chest, Hyperlipidemia, Hypothyroidism, Left shoulder pain, Obesity (BMI 30.0-34.9), Osteoarthritis of right knee, Prolonged emergence from general anesthesia, S/P CABG x 2, Seizure ([address] TIA, and TIA (transient ischemic attack). Past surgical history:  has a past surgical history that includes Appendectomy; Cholecystectomy; Coronary artery bypass graft (6/18/2012); Inguinal hernia repair (Right, 07/14/2016); Cardiac surgery; Cardiac catheterization; and cervical fusion (N/A, 9/16/2021).   Social History:   Social History     Tobacco Use    Smoking status: Former     Packs/day: 1.00     Years: 43.00     Pack years: 43.00     Types: Cigarettes     Quit date: 8/11/2010     Years since quittin.3    Smokeless tobacco: Never   Substance Use Topics    Alcohol use: Yes     Comment:  occasionally has 1 beer     Family history: family history includes Cancer in his father and sister; Diabetes in his sister; Heart Disease in his mother; High Cholesterol in his sister; Stroke in his brother and brother. ALLERGIES:  Clopidogrel and Simvastatin  Prior to Admission medications    Medication Sig Start Date End Date Taking? Authorizing Provider   famotidine (PEPCID) 40 MG tablet TAKE 1 TABLET BY MOUTH EVERY DAY 22  Yes Historical Provider, MD   atorvastatin (LIPITOR) 40 MG tablet Take 1 tablet by mouth daily 22  Yes Henrry Husain MD   aspirin 81 MG EC tablet Take 81 mg by mouth daily   Yes Historical Provider, MD   bisacodyl (DULCOLAX) 5 MG EC tablet Take 1 tablet by mouth daily 21  Yes Ascencion Garcia PA-C   nitroGLYCERIN (NITROSTAT) 0.4 MG SL tablet Place 1 tablet under the tongue every 5 minutes as needed for Chest pain 20  Yes Henrry Husain MD   SYNTHROID 137 MCG tablet TAKE 1 TABLET BY MOUTH EVERY DAY 19  Yes Historical Provider, MD   lisinopril (PRINIVIL;ZESTRIL) 10 MG tablet Take 10 mg by mouth daily   Yes Historical Provider, MD     Vitals:    22 0929   BP: 108/78   Pulse: 84   Resp: 16   Weight: 227 lb (103 kg)   Height: 5' 8\" (1.727 m)      Body mass index is 34.52 kg/m². Wt Readings from Last 3 Encounters:   22 227 lb (103 kg)   22 221 lb (100.2 kg)   22 227 lb (103 kg)     Constitutional:  Patient is moderately overweight male in no apparent distress. he gained 6 pounds since last visit. Cardiovascular: Auscultation: Normal S1 and S2. No murmurs or gallops noted. Carotids are negative for bruits. Respiratory:  Respiratory effort is normal. Breath sounds are clear to auscultation. Extremities:  No edema, clubbing,  Cyanosis, petechiae. Abdomen:  No masses or tenderness. No organomegaly noted.   Neurologic: Oriented to time, place, and person and non-anxious. No focal neurological deficit noted. Psychiatric: Normal mood and effect. LAB REVIEW:  CBC:   Component 11/01/22 06/28/22 01/26/22 09/15/21 07/16/21 02/08/21   WBC (WHITE BLOOD COUNT) 9.14 9.85 10.30 9.57 8.21 7.84   RBC 4.70 4.61 4.97 4.71 4.54 4.34   HEMOGLOBIN (HGB) 14.1 13.8 Low  14.9 14.3 13.9 Low  13.5 Low    HEMATOCRIT (HCT) 43.0 42.0 45.3 42.6 42.0 40.9 Low    MEAN CELL VOLUME 91.5 91.1 91.1 90.4 92.5 94.2   Mean Cell HGB 30.0 29.9 30.0 30.4 30.6 31.1   MEAN CELL HGB CONCENTRATION 32.8 32.9 32.9 33.6 33.1 33.0   PLATELET COUNT 986 961 283 260 230 243     Lipids:   Component 11/01/22 06/28/22 01/26/22 07/16/21 02/08/21   TRIGLYCERIDE 156 161 High  159 116 162 High    CHOLESTEROL 138  131  127  115  152    HDL CHOLESTEROL 35  37  36  32  34    LDL CHOLESTEROL, CALCULATED 72  62  59  60  86      Renal:   Component 11/01/22 06/28/22 01/26/22 09/15/21 07/16/21 02/08/21   SODIUM 142 139 139 138 141 140   POTASSIUM 4.6 4.6 4.5 4.4 4.4 4.6   CHLORIDE 106 106 102 105 109 106   CARBON DIOXIDE (CO2) 25.0 24.0 28.0 24.0 24.0 25.0   GLUCOSE 97 100 111 High  105 High  99 96   BUN 15 13 15 12 7 16   CREATININE SERUM 0.98 0.91 0.86 0.90 0.80 0.80   CALCIUM 9.2 9.6 9.4 9.5 9.0 9.1   PROTEIN, TOTAL 7.4 7.3 7.9 7.4 7.3 7.2   Albumin 4.2 4.0 -- -- -- --   ALT 50 52 54 71 62 99 High    ALKALINE PHOSPHATASE 54 73 84 70 69 65     IMPRESSION and RECOMMENDATIONS:      1. S/P CABG x 2, lima-lad svg-cx 6/2012  Assessment & Plan:  Clinically stable. Continue aggressive risk factor modification for secondary prevention   2. ASMITA on CPAP  Assessment & Plan:  Continue CPAP therapy. 3. Pure hypercholesterolemia  Assessment & Plan:  LDL went up to 72 on Lipitor 40 mg daily. Has been. Counseled for diet and weight management and repeat lipids in 6 months. LDL goal is 70     Continue current cardiovascular medications which have been reviewed and discussed individually with you. Counseled for calorie counting, reduction in serving size and exercise and lifestyle modification for weight loss. Counseled for 30 minutes a day of moderate intensity aerobic exercise like activity. Primary/secondary prevention is the goal by aggressive risk modification, healthy and therapeutic life style changes for cardiovascular risk reduction. Various goals are discussed and questions answered. Appropriate prescriptions if needed on this visit are addressed. After visit summery is provided. Questions answered and patient verbalizes understanding. Follow up in 6 months with ECG,  sooner if needed. Yasir Gaviria MD, 12/27/2022 10:05 AM     Please note this report has been partially produced using speech recognition software and may contain errors related to that system including errors in grammar, punctuation, and spelling, as well as words and phrases that may be inappropriate. If there are any questions or concerns please feel free to contact the dictating provider for clarification.

## 2022-12-27 NOTE — ASSESSMENT & PLAN NOTE
LDL went up to 72 on Lipitor 40 mg daily. Has been. Counseled for diet and weight management and repeat lipids in 6 months.   LDL goal is 70

## 2023-07-03 ENCOUNTER — OFFICE VISIT (OUTPATIENT)
Dept: CARDIOLOGY CLINIC | Age: 68
End: 2023-07-03
Payer: MEDICARE

## 2023-07-03 VITALS
HEART RATE: 80 BPM | OXYGEN SATURATION: 96 % | DIASTOLIC BLOOD PRESSURE: 80 MMHG | SYSTOLIC BLOOD PRESSURE: 110 MMHG | BODY MASS INDEX: 34.31 KG/M2 | HEIGHT: 68 IN | RESPIRATION RATE: 16 BRPM | WEIGHT: 226.4 LBS

## 2023-07-03 DIAGNOSIS — E78.2 MIXED HYPERLIPIDEMIA: ICD-10-CM

## 2023-07-03 DIAGNOSIS — Z99.89 OSA ON CPAP: ICD-10-CM

## 2023-07-03 DIAGNOSIS — G47.33 OSA ON CPAP: ICD-10-CM

## 2023-07-03 DIAGNOSIS — Z95.1 S/P CABG X 2: Primary | ICD-10-CM

## 2023-07-03 PROCEDURE — 3017F COLORECTAL CA SCREEN DOC REV: CPT | Performed by: INTERNAL MEDICINE

## 2023-07-03 PROCEDURE — 99214 OFFICE O/P EST MOD 30 MIN: CPT | Performed by: INTERNAL MEDICINE

## 2023-07-03 PROCEDURE — G8427 DOCREV CUR MEDS BY ELIG CLIN: HCPCS | Performed by: INTERNAL MEDICINE

## 2023-07-03 PROCEDURE — G8417 CALC BMI ABV UP PARAM F/U: HCPCS | Performed by: INTERNAL MEDICINE

## 2023-07-03 PROCEDURE — 93000 ELECTROCARDIOGRAM COMPLETE: CPT | Performed by: INTERNAL MEDICINE

## 2023-07-03 PROCEDURE — 1123F ACP DISCUSS/DSCN MKR DOCD: CPT | Performed by: INTERNAL MEDICINE

## 2023-07-03 PROCEDURE — 1036F TOBACCO NON-USER: CPT | Performed by: INTERNAL MEDICINE

## 2023-07-03 RX ORDER — NITROGLYCERIN 0.4 MG/1
0.4 TABLET SUBLINGUAL EVERY 5 MIN PRN
Qty: 25 TABLET | Refills: 3 | Status: SHIPPED | OUTPATIENT
Start: 2023-07-03

## 2023-07-03 NOTE — ASSESSMENT & PLAN NOTE
Clinically stable. Continue aggressive risk factor modification for secondary prevention. Patient is counseled for regular at least 30 minutes a day 5 times a week aerobic exercise like activities.

## 2023-07-03 NOTE — PROGRESS NOTES
Mary Gilbert  1955  [place]      Chief Complaint   Patient presents with    6 Month Follow-Up    Hyperlipidemia     Denies cardiac sx    Edema     Continues to have swelling within the bilateral lower extremity on occasion. Chief complaint and HPI:  Mary Gilbert  is a 70 y.o. male following up for known coronary artery disease status post bypass surgery and has hyperlipidemia and history of obstructive sleep apnea however he is not using CPAP for the last 3 years and did not feel any different when he was using it so he gave up. Denies any cardiac symptoms today. He stays active but does not do any regular exercises. He does yard work and house chores. He does play computer games to keep himself mentally active. Rest of the Cardiovascular system review is otherwise unchanged from prior encounter. Past medical history:  has a past medical history of [id number], Arthritis, CAD (coronary artery disease), H/O 24 hour EKG monitoring, H/O cardiac catheterization, H/O cardiovascular stress test, H/O chest x-ray, H/O Doppler ultrasound, H/O echocardiogram, Hashimoto disease, History of cardiac monitoring, History of CT scan of brain, History of CT scan of chest, Hyperlipidemia, Hypothyroidism, Left shoulder pain, Obesity (BMI 30.0-34.9), Osteoarthritis of right knee, Prolonged emergence from general anesthesia, S/P CABG x 2, Seizure ([address]), and TIA (transient ischemic attack). Past surgical history:  has a past surgical history that includes Appendectomy; Cholecystectomy; Coronary artery bypass graft (2012); Inguinal hernia repair (Right, 2016); Cardiac surgery; Cardiac catheterization; and cervical fusion (N/A, 2021).   Social History:   Social History     Tobacco Use    Smoking status: Former     Packs/day: 1.00     Years: 43.00     Pack years: 43.00     Types: Cigarettes     Quit date: 2010     Years since quittin.9    Smokeless tobacco: Never

## 2023-07-03 NOTE — PATIENT INSTRUCTIONS
Continue current cardiovascular medications which have been reviewed and discussed individually with you. Primary/secondary prevention is the goal by aggressive risk modification, healthy and therapeutic life style changes for cardiovascular risk reduction. Various goals are discussed and questions answered. Continue current cardiovascular medications which have been reviewed and discussed individually with you. Counseled for 30 minutes a day of moderate intensity aerobic exercise like activity. Appropriate prescriptions if needed on this visit are addressed. After visit summery is provided. Questions answered and patient verbalizes understanding. Follow up in 6 months,  sooner if needed.
Time-based billing (NON-critical care)

## 2024-02-06 ENCOUNTER — OFFICE VISIT (OUTPATIENT)
Dept: CARDIOLOGY CLINIC | Age: 69
End: 2024-02-06
Payer: MEDICARE

## 2024-02-06 VITALS
WEIGHT: 225.6 LBS | DIASTOLIC BLOOD PRESSURE: 88 MMHG | BODY MASS INDEX: 34.19 KG/M2 | HEART RATE: 66 BPM | HEIGHT: 68 IN | SYSTOLIC BLOOD PRESSURE: 134 MMHG

## 2024-02-06 DIAGNOSIS — Z95.1 S/P CABG X 2: Primary | ICD-10-CM

## 2024-02-06 DIAGNOSIS — E78.2 MIXED HYPERLIPIDEMIA: ICD-10-CM

## 2024-02-06 DIAGNOSIS — G47.33 OSA ON CPAP: ICD-10-CM

## 2024-02-06 DIAGNOSIS — I20.89 OTHER FORMS OF ANGINA PECTORIS: ICD-10-CM

## 2024-02-06 PROBLEM — R07.9 CHEST PAIN: Status: ACTIVE | Noted: 2024-02-06

## 2024-02-06 PROCEDURE — 93000 ELECTROCARDIOGRAM COMPLETE: CPT | Performed by: INTERNAL MEDICINE

## 2024-02-06 PROCEDURE — G8484 FLU IMMUNIZE NO ADMIN: HCPCS | Performed by: INTERNAL MEDICINE

## 2024-02-06 PROCEDURE — G8427 DOCREV CUR MEDS BY ELIG CLIN: HCPCS | Performed by: INTERNAL MEDICINE

## 2024-02-06 PROCEDURE — 3017F COLORECTAL CA SCREEN DOC REV: CPT | Performed by: INTERNAL MEDICINE

## 2024-02-06 PROCEDURE — 1123F ACP DISCUSS/DSCN MKR DOCD: CPT | Performed by: INTERNAL MEDICINE

## 2024-02-06 PROCEDURE — 99214 OFFICE O/P EST MOD 30 MIN: CPT | Performed by: INTERNAL MEDICINE

## 2024-02-06 PROCEDURE — G8417 CALC BMI ABV UP PARAM F/U: HCPCS | Performed by: INTERNAL MEDICINE

## 2024-02-06 PROCEDURE — 1036F TOBACCO NON-USER: CPT | Performed by: INTERNAL MEDICINE

## 2024-02-06 NOTE — ASSESSMENT & PLAN NOTE
Patient is not using CPAP therapy anymore.  He says he snores but gets enough hours of good sleep and does not feel that he needs to use CPAP.

## 2024-02-06 NOTE — ASSESSMENT & PLAN NOTE
Patient is having symptoms suggestive of atypical angina we will evaluate him noninvasively.  Counseled to continue aggressive risk factor modification for secondary prevention.

## 2024-02-06 NOTE — PATIENT INSTRUCTIONS
Continue current cardiovascular medications which have been reviewed and discussed individually with you.  Stress Cardiolite and chart check. Counseled for 30 minutes a day of moderate intensity aerobic exercise like activity.   Primary/secondary prevention is the goal by aggressive risk modification, healthy and therapeutic life style changes for cardiovascular risk reduction. Various goals are discussed and questions answered. Appropriate prescriptions if needed on this visit are addressed. After visit summery is provided.   Questions answered and patient verbalizes understanding. Follow up in 6 months,  sooner if needed.

## 2024-02-06 NOTE — PROGRESS NOTES
Benton Pena  1955  [place]      Chief Complaint   Patient presents with    Coronary Artery Disease    Hyperlipidemia    Follow-up     Chest pain on occ left side shoulder area   No SOB dizziness,swelling or palpitations     Chief complaint and HPI:  Benton Pena  is a 68 y.o. male following up for known coronary artery disease status post bypass surgery in 2012 and hyperlipidemia and history of obstructive sleep apnea however he is not using CPAP or the last few years.  He reports some chest tightness and pressure which she thinks is from the left shoulder not necessarily gets worse with physical activity like getting up and down desk exercise machine but does not get his heart rate up.  Does not seem to bother his chest pressure.  Denies any nausea vomiting or diaphoresis.  Medications reviewed and reconciled.  He is compliant with his medications.    Rest of the Cardiovascular system review is otherwise unchanged from prior encounter.  Past medical history:  has a past medical history of [id number], Arthritis, CAD (coronary artery disease), H/O 24 hour EKG monitoring, H/O cardiac catheterization, H/O cardiovascular stress test, H/O chest x-ray, H/O Doppler ultrasound, H/O echocardiogram, Hashimoto disease, History of cardiac monitoring, History of CT scan of brain, History of CT scan of chest, Hyperlipidemia, Hypothyroidism, Left shoulder pain, Obesity (BMI 30.0-34.9), Osteoarthritis of right knee, Prolonged emergence from general anesthesia, S/P CABG x 2, Seizure ([facility]), and TIA (transient ischemic attack).  Past surgical history:  has a past surgical history that includes Appendectomy; Cholecystectomy; Coronary artery bypass graft (6/18/2012); Inguinal hernia repair (Right, 07/14/2016); Cardiac surgery; Cardiac catheterization; and cervical fusion (N/A, 9/16/2021).  Social History:   Social History     Tobacco Use    Smoking status: Former     Current packs/day: 0.00     Average

## 2024-02-06 NOTE — ASSESSMENT & PLAN NOTE
Sounds atypical by description however he never had typical symptoms even before bypass surgery.  We will evaluate him noninvasively.  Last stress test was in 2021.

## 2024-02-14 ENCOUNTER — TELEPHONE (OUTPATIENT)
Dept: CARDIOLOGY CLINIC | Age: 69
End: 2024-02-14

## 2024-02-14 NOTE — TELEPHONE ENCOUNTER
Called to patient the results of recent testing nm - Near maximal study negative for angina or EKG evidence of ischemia.   Patient verbalized understanding of all information given.

## 2024-04-29 RX ORDER — ATORVASTATIN CALCIUM 40 MG/1
40 TABLET, FILM COATED ORAL DAILY
Qty: 90 TABLET | Refills: 3 | Status: SHIPPED | OUTPATIENT
Start: 2024-04-29

## 2024-07-16 ENCOUNTER — HOSPITAL ENCOUNTER (OUTPATIENT)
Dept: GENERAL RADIOLOGY | Age: 69
Discharge: HOME | End: 2024-07-16
Payer: MEDICARE

## 2024-07-16 ENCOUNTER — HOSPITAL ENCOUNTER (OUTPATIENT)
Age: 69
Discharge: HOME | End: 2024-07-16
Payer: MEDICARE

## 2024-07-16 DIAGNOSIS — I51.9 HEART DISEASE, UNSPECIFIED: ICD-10-CM

## 2024-07-16 DIAGNOSIS — I10 ESSENTIAL HYPERTENSION, MALIGNANT: ICD-10-CM

## 2024-07-16 DIAGNOSIS — J39.2 EDEMA OF PHARYNX OR NASOPHARYNX: ICD-10-CM

## 2024-07-16 DIAGNOSIS — I25.2 OLD MYOCARDIAL INFARCTION: ICD-10-CM

## 2024-07-16 DIAGNOSIS — G45.9 INTERMITTENT CEREBRAL ISCHEMIA: ICD-10-CM

## 2024-07-16 LAB — HEMOGLOBIN: 14.3 GM/DL (ref 13.5–18)

## 2024-07-16 PROCEDURE — 71046 X-RAY EXAM CHEST 2 VIEWS: CPT

## 2024-07-16 PROCEDURE — 36415 COLL VENOUS BLD VENIPUNCTURE: CPT

## 2024-07-16 PROCEDURE — 85018 HEMOGLOBIN: CPT

## 2024-07-17 ENCOUNTER — TRANSCRIBE ORDERS (OUTPATIENT)
Dept: ADMINISTRATIVE | Age: 69
End: 2024-07-17

## 2024-07-17 DIAGNOSIS — Z13.6 SCREENING FOR ABDOMINAL AORTIC ANEURYSM (AAA) PERFORMED: Primary | ICD-10-CM

## 2024-07-18 ENCOUNTER — TRANSCRIBE ORDERS (OUTPATIENT)
Dept: ADMINISTRATIVE | Age: 69
End: 2024-07-18

## 2024-07-18 DIAGNOSIS — Z13.6 SCREENING FOR AAA (ABDOMINAL AORTIC ANEURYSM): Primary | ICD-10-CM

## 2024-07-25 ENCOUNTER — HOSPITAL ENCOUNTER (OUTPATIENT)
Dept: ULTRASOUND IMAGING | Age: 69
Discharge: HOME | End: 2024-07-25
Payer: MEDICARE

## 2024-07-25 ENCOUNTER — HOSPITAL ENCOUNTER (OUTPATIENT)
Dept: CT IMAGING | Age: 69
Discharge: HOME | End: 2024-07-25
Payer: MEDICARE

## 2024-07-25 DIAGNOSIS — F17.211 CIGARETTE NICOTINE DEPENDENCE IN REMISSION: ICD-10-CM

## 2024-07-25 DIAGNOSIS — Z87.891 PERSONAL HISTORY OF TOBACCO USE: ICD-10-CM

## 2024-07-25 DIAGNOSIS — Z13.6 SCREENING FOR AAA (ABDOMINAL AORTIC ANEURYSM): ICD-10-CM

## 2024-07-25 PROCEDURE — 76775 US EXAM ABDO BACK WALL LIM: CPT

## 2024-07-25 PROCEDURE — 71271 CT THORAX LUNG CANCER SCR C-: CPT

## 2024-08-05 ENCOUNTER — OFFICE VISIT (OUTPATIENT)
Dept: CARDIOLOGY CLINIC | Age: 69
End: 2024-08-05
Payer: MEDICARE

## 2024-08-05 VITALS
WEIGHT: 228 LBS | SYSTOLIC BLOOD PRESSURE: 122 MMHG | BODY MASS INDEX: 34.56 KG/M2 | DIASTOLIC BLOOD PRESSURE: 68 MMHG | HEART RATE: 80 BPM | HEIGHT: 68 IN

## 2024-08-05 DIAGNOSIS — I10 PRIMARY HYPERTENSION: ICD-10-CM

## 2024-08-05 DIAGNOSIS — Z95.1 S/P CABG X 2: Primary | ICD-10-CM

## 2024-08-05 DIAGNOSIS — E78.2 MIXED HYPERLIPIDEMIA: ICD-10-CM

## 2024-08-05 PROBLEM — R07.9 CHEST PAIN: Status: RESOLVED | Noted: 2024-02-06 | Resolved: 2024-08-05

## 2024-08-05 PROCEDURE — 3017F COLORECTAL CA SCREEN DOC REV: CPT | Performed by: INTERNAL MEDICINE

## 2024-08-05 PROCEDURE — G8417 CALC BMI ABV UP PARAM F/U: HCPCS | Performed by: INTERNAL MEDICINE

## 2024-08-05 PROCEDURE — 1036F TOBACCO NON-USER: CPT | Performed by: INTERNAL MEDICINE

## 2024-08-05 PROCEDURE — 1123F ACP DISCUSS/DSCN MKR DOCD: CPT | Performed by: INTERNAL MEDICINE

## 2024-08-05 PROCEDURE — 3074F SYST BP LT 130 MM HG: CPT | Performed by: INTERNAL MEDICINE

## 2024-08-05 PROCEDURE — 3078F DIAST BP <80 MM HG: CPT | Performed by: INTERNAL MEDICINE

## 2024-08-05 PROCEDURE — 99214 OFFICE O/P EST MOD 30 MIN: CPT | Performed by: INTERNAL MEDICINE

## 2024-08-05 PROCEDURE — G8427 DOCREV CUR MEDS BY ELIG CLIN: HCPCS | Performed by: INTERNAL MEDICINE

## 2024-08-05 RX ORDER — ATORVASTATIN CALCIUM 40 MG/1
40 TABLET, FILM COATED ORAL DAILY
Qty: 90 TABLET | Refills: 3 | Status: SHIPPED | OUTPATIENT
Start: 2024-08-05

## 2024-08-05 NOTE — PATIENT INSTRUCTIONS
Continue current cardiovascular medications which have been reviewed and discussed individually with you. Primary/secondary prevention is the goal by aggressive risk modification, healthy and therapeutic life style changes for cardiovascular risk reduction. Various goals are discussed and questions answered.  Appropriate prescriptions if needed on this visit are addressed. After visit summery is provided.   Questions answered and patient verbalizes understanding. Follow up in 12 months with ECG,  sooner if needed.

## 2024-08-05 NOTE — ASSESSMENT & PLAN NOTE
Patient is symptom-free.  Last stress test earlier this year was negative for ischemia.  Counseled to continue aggressive risk factor modification for secondary prevention.

## 2024-08-05 NOTE — PROGRESS NOTES
Benton Pena  1955  [place]      Chief Complaint   Patient presents with    Coronary Artery Disease    Hyperlipidemia     OV for 6 month check. Pt denies any chest pain,SOB,dizziness,swelling to ankles, or palpitations.     Chief complaint and HPI:  Benton Pena  is a 69 y.o. male following up on coronary artery disease hypertension hyperlipidemia.  Denies any cardiac symptoms.  He has not used CPAP therapy for obstructive sleep apnea for almost 5 years and he says he sleeps well.  He is not exercising.  Head nasal polyp removed recently and has difficulty in hearing however concerned about the cost of hearing aids and not going for it for now.  He is compliant to medications and medications reviewed and reconciled.  He is a former smoker.    Rest of the Cardiovascular system review is otherwise unchanged from prior encounter.  Past medical history:  has a past medical history of [id number], Arthritis, CAD (coronary artery disease), H/O 24 hour EKG monitoring, H/O cardiac catheterization, H/O cardiovascular stress test, H/O chest x-ray, H/O Doppler ultrasound, H/O echocardiogram, Hashimoto disease, History of cardiac monitoring, History of CT scan of brain, History of CT scan of chest, History of nuclear stress test, Hyperlipidemia, Hypertension, Hypothyroidism, Left shoulder pain, Obesity (BMI 30.0-34.9), Osteoarthritis of right knee, Prolonged emergence from general anesthesia, S/P CABG x 2, Seizure ([facility]), and TIA (transient ischemic attack).  Past surgical history:  has a past surgical history that includes Appendectomy; Cholecystectomy; Coronary artery bypass graft (6/18/2012); Inguinal hernia repair (Right, 07/14/2016); Cardiac surgery; Cardiac catheterization; and cervical fusion (N/A, 9/16/2021).  Social History:   Social History     Tobacco Use    Smoking status: Former     Current packs/day: 0.00     Average packs/day: 1 pack/day for 43.0 years (43.0 ttl pk-yrs)     Types:

## 2025-08-04 ENCOUNTER — OFFICE VISIT (OUTPATIENT)
Dept: CARDIOLOGY CLINIC | Age: 70
End: 2025-08-04
Payer: MEDICARE

## 2025-08-04 VITALS
BODY MASS INDEX: 32.89 KG/M2 | WEIGHT: 217 LBS | HEART RATE: 74 BPM | SYSTOLIC BLOOD PRESSURE: 136 MMHG | HEIGHT: 68 IN | DIASTOLIC BLOOD PRESSURE: 84 MMHG

## 2025-08-04 DIAGNOSIS — I10 PRIMARY HYPERTENSION: ICD-10-CM

## 2025-08-04 DIAGNOSIS — G47.33 OSA ON CPAP: ICD-10-CM

## 2025-08-04 DIAGNOSIS — Z95.1 S/P CABG X 2: Primary | ICD-10-CM

## 2025-08-04 DIAGNOSIS — E78.2 MIXED HYPERLIPIDEMIA: ICD-10-CM

## 2025-08-04 PROBLEM — M25.512 LEFT SHOULDER PAIN: Status: RESOLVED | Noted: 2019-06-25 | Resolved: 2025-08-04

## 2025-08-04 PROCEDURE — G8427 DOCREV CUR MEDS BY ELIG CLIN: HCPCS | Performed by: INTERNAL MEDICINE

## 2025-08-04 PROCEDURE — G8417 CALC BMI ABV UP PARAM F/U: HCPCS | Performed by: INTERNAL MEDICINE

## 2025-08-04 PROCEDURE — 3079F DIAST BP 80-89 MM HG: CPT | Performed by: INTERNAL MEDICINE

## 2025-08-04 PROCEDURE — 1036F TOBACCO NON-USER: CPT | Performed by: INTERNAL MEDICINE

## 2025-08-04 PROCEDURE — 93000 ELECTROCARDIOGRAM COMPLETE: CPT | Performed by: INTERNAL MEDICINE

## 2025-08-04 PROCEDURE — 99214 OFFICE O/P EST MOD 30 MIN: CPT | Performed by: INTERNAL MEDICINE

## 2025-08-04 PROCEDURE — 3017F COLORECTAL CA SCREEN DOC REV: CPT | Performed by: INTERNAL MEDICINE

## 2025-08-04 PROCEDURE — 1159F MED LIST DOCD IN RCRD: CPT | Performed by: INTERNAL MEDICINE

## 2025-08-04 PROCEDURE — 1123F ACP DISCUSS/DSCN MKR DOCD: CPT | Performed by: INTERNAL MEDICINE

## 2025-08-04 PROCEDURE — 3075F SYST BP GE 130 - 139MM HG: CPT | Performed by: INTERNAL MEDICINE

## 2025-08-04 RX ORDER — ATORVASTATIN CALCIUM 40 MG/1
40 TABLET, FILM COATED ORAL DAILY
Qty: 90 TABLET | Refills: 3 | Status: SHIPPED | OUTPATIENT
Start: 2025-08-04

## 2025-08-04 RX ORDER — NITROGLYCERIN 0.4 MG/1
0.4 TABLET SUBLINGUAL EVERY 5 MIN PRN
Qty: 25 TABLET | Refills: 3 | Status: SHIPPED | OUTPATIENT
Start: 2025-08-04

## 2025-08-04 RX ORDER — FAMOTIDINE 10 MG
10 TABLET ORAL 2 TIMES DAILY
COMMUNITY

## 2025-09-02 ENCOUNTER — TRANSCRIBE ORDERS (OUTPATIENT)
Dept: ADMINISTRATIVE | Age: 70
End: 2025-09-02

## 2025-09-02 DIAGNOSIS — Z87.891 PERSONAL HISTORY OF TOBACCO USE: Primary | ICD-10-CM

## (undated) DEVICE — 3.0MM PRECISION NEURO (MATCH HEAD)

## (undated) DEVICE — RESERVOIR,SUCTION,100CC,SILICONE: Brand: MEDLINE

## (undated) DEVICE — MANIFOLD 4 PRTS RT SIDE 360/ON

## (undated) DEVICE — SPONGE,PEANUT,XRAY,ST,SM,3/8",5/CARD: Brand: MEDLINE INDUSTRIES, INC.

## (undated) DEVICE — INSTRUMENT 875-088 14MM DSTRCT PIN STRL: Brand: MEDTRONIC REUSABLE INSTRUMENT

## (undated) DEVICE — SOLUTION IV IRRIG 1000ML POUR BTL 2F7114

## (undated) DEVICE — STRYKER BIPOLAR IRRIGATOR INTEGRATED TUBING AND BIPOLAR CORD SET

## (undated) DEVICE — ADHESIVE LIQ 2OZ ADJUNCT FOR DSG MASTISOL

## (undated) DEVICE — SUTURE 3-0 VCRL CTD SH-1 J219H

## (undated) DEVICE — DRESSING TRNSPAR W2XL2.75IN FLM SHT SEMIPERMEABLE WIND

## (undated) DEVICE — AGENT HEMSTAT 8ML FLX TIP MTRX + DISP SURGIFLO

## (undated) DEVICE — SUTURE VCRL SZ 2-0 L18IN ABSRB VLT L26MM SH 1/2 CIR J775D

## (undated) DEVICE — BLADE STAINLESS-STEEL #10 STERILE DISP

## (undated) DEVICE — ELECTRODE ES L2.75IN S STL INSUL BLDE W/ SL EDGE

## (undated) DEVICE — COVER MICSCP W46XL120IN 4 BINOC GLS LENS LEICA

## (undated) DEVICE — SUTURE VCRL SZ 3-0 L18IN ABSRB UD L26MM SH 1/2 CIR J864D

## (undated) DEVICE — DRAIN SURG 10FR L1/8IN RND CHN FULL FLUT HEAT POLISHED PERF

## (undated) DEVICE — AGENT HEMOSTATIC SURGIFLOW MATRIX KIT W/THROMBIN

## (undated) DEVICE — PACKING 8004007 NEURAY 200PK 13X76MM: Brand: NEURAY ®

## (undated) DEVICE — AEGIS 1" DISK 4MM HOLE, PEEL OPEN: Brand: MEDLINE